# Patient Record
Sex: FEMALE | Race: WHITE | Employment: PART TIME | URBAN - METROPOLITAN AREA
[De-identification: names, ages, dates, MRNs, and addresses within clinical notes are randomized per-mention and may not be internally consistent; named-entity substitution may affect disease eponyms.]

---

## 2017-12-10 ENCOUNTER — APPOINTMENT (OUTPATIENT)
Dept: GENERAL RADIOLOGY | Age: 27
End: 2017-12-10
Payer: COMMERCIAL

## 2017-12-10 ENCOUNTER — HOSPITAL ENCOUNTER (EMERGENCY)
Age: 27
Discharge: HOME | End: 2017-12-10
Attending: EMERGENCY MEDICINE
Payer: COMMERCIAL

## 2017-12-10 VITALS
RESPIRATION RATE: 16 BRPM | DIASTOLIC BLOOD PRESSURE: 56 MMHG | HEIGHT: 62 IN | OXYGEN SATURATION: 97 % | WEIGHT: 180 LBS | BODY MASS INDEX: 33.13 KG/M2 | HEART RATE: 74 BPM | SYSTOLIC BLOOD PRESSURE: 127 MMHG | TEMPERATURE: 97.3 F

## 2017-12-10 DIAGNOSIS — M54.50 ACUTE BILATERAL LOW BACK PAIN WITHOUT SCIATICA: Primary | ICD-10-CM

## 2017-12-10 LAB
-: ABNORMAL
AMORPHOUS: ABNORMAL
BACTERIA: ABNORMAL
BILIRUBIN URINE: NEGATIVE
CASTS UA: ABNORMAL /LPF (ref 0–8)
COLOR: YELLOW
COMMENT UA: ABNORMAL
CRYSTALS, UA: ABNORMAL /HPF
EPITHELIAL CELLS UA: ABNORMAL /HPF (ref 0–5)
GLUCOSE URINE: NEGATIVE
HCG(URINE) PREGNANCY TEST: NEGATIVE
KETONES, URINE: NEGATIVE
LEUKOCYTE ESTERASE, URINE: ABNORMAL
MUCUS: ABNORMAL
NITRITE, URINE: NEGATIVE
OTHER OBSERVATIONS UA: ABNORMAL
PH UA: 7.5 (ref 5–8)
PROTEIN UA: NEGATIVE
RBC UA: ABNORMAL /HPF (ref 0–4)
RENAL EPITHELIAL, UA: ABNORMAL /HPF
SEDIMENTATION RATE, ERYTHROCYTE: 3 MM (ref 0–20)
SPECIFIC GRAVITY UA: 1.02 (ref 1–1.03)
TRICHOMONAS: ABNORMAL
TURBIDITY: ABNORMAL
URINE HGB: NEGATIVE
UROBILINOGEN, URINE: NORMAL
WBC UA: ABNORMAL /HPF (ref 0–5)
YEAST: ABNORMAL

## 2017-12-10 PROCEDURE — 81001 URINALYSIS AUTO W/SCOPE: CPT

## 2017-12-10 PROCEDURE — 96372 THER/PROPH/DIAG INJ SC/IM: CPT

## 2017-12-10 PROCEDURE — 72170 X-RAY EXAM OF PELVIS: CPT

## 2017-12-10 PROCEDURE — 84703 CHORIONIC GONADOTROPIN ASSAY: CPT

## 2017-12-10 PROCEDURE — 99283 EMERGENCY DEPT VISIT LOW MDM: CPT

## 2017-12-10 PROCEDURE — 85651 RBC SED RATE NONAUTOMATED: CPT

## 2017-12-10 PROCEDURE — 6370000000 HC RX 637 (ALT 250 FOR IP): Performed by: INTERNAL MEDICINE

## 2017-12-10 PROCEDURE — 6360000002 HC RX W HCPCS: Performed by: INTERNAL MEDICINE

## 2017-12-10 RX ORDER — IBUPROFEN 400 MG/1
400 TABLET ORAL
Status: DISCONTINUED | OUTPATIENT
Start: 2017-12-10 | End: 2017-12-10

## 2017-12-10 RX ORDER — FENTANYL CITRATE 50 UG/ML
50 INJECTION, SOLUTION INTRAMUSCULAR; INTRAVENOUS ONCE
Status: COMPLETED | OUTPATIENT
Start: 2017-12-10 | End: 2017-12-10

## 2017-12-10 RX ORDER — IBUPROFEN 400 MG/1
600 TABLET ORAL
Status: DISCONTINUED | OUTPATIENT
Start: 2017-12-10 | End: 2017-12-10 | Stop reason: HOSPADM

## 2017-12-10 RX ORDER — TIZANIDINE 4 MG/1
4 TABLET ORAL EVERY 6 HOURS PRN
Qty: 20 TABLET | Refills: 0 | Status: SHIPPED | OUTPATIENT
Start: 2017-12-10 | End: 2018-02-24

## 2017-12-10 RX ORDER — FENTANYL CITRATE 50 UG/ML
25 INJECTION, SOLUTION INTRAMUSCULAR; INTRAVENOUS
Status: DISCONTINUED | OUTPATIENT
Start: 2017-12-10 | End: 2017-12-10

## 2017-12-10 RX ORDER — TIZANIDINE 4 MG/1
4 TABLET ORAL EVERY 6 HOURS PRN
Status: DISCONTINUED | OUTPATIENT
Start: 2017-12-10 | End: 2017-12-10 | Stop reason: HOSPADM

## 2017-12-10 RX ORDER — DICLOFENAC SODIUM 25 MG/1
50 TABLET, DELAYED RELEASE ORAL 2 TIMES DAILY
Status: DISCONTINUED | OUTPATIENT
Start: 2017-12-10 | End: 2017-12-10 | Stop reason: CLARIF

## 2017-12-10 RX ADMIN — FENTANYL CITRATE 50 MCG: 50 INJECTION, SOLUTION INTRAMUSCULAR; INTRAVENOUS at 10:18

## 2017-12-10 RX ADMIN — IBUPROFEN 600 MG: 400 TABLET, FILM COATED ORAL at 09:24

## 2017-12-10 RX ADMIN — TIZANIDINE 4 MG: 4 TABLET ORAL at 09:25

## 2017-12-10 ASSESSMENT — ENCOUNTER SYMPTOMS
VOMITING: 0
DOUBLE VISION: 0
BACK PAIN: 1
NAUSEA: 0
HEARTBURN: 0
BLURRED VISION: 0
HEMOPTYSIS: 0
COUGH: 0

## 2017-12-10 ASSESSMENT — PAIN SCALES - GENERAL: PAINLEVEL_OUTOF10: 8

## 2017-12-10 ASSESSMENT — PAIN DESCRIPTION - ORIENTATION: ORIENTATION: RIGHT

## 2017-12-10 ASSESSMENT — PAIN DESCRIPTION - DESCRIPTORS: DESCRIPTORS: ACHING

## 2017-12-10 ASSESSMENT — PAIN DESCRIPTION - FREQUENCY: FREQUENCY: CONTINUOUS

## 2017-12-10 ASSESSMENT — PAIN DESCRIPTION - LOCATION: LOCATION: BUTTOCKS

## 2017-12-10 ASSESSMENT — PAIN DESCRIPTION - PAIN TYPE: TYPE: ACUTE PAIN

## 2017-12-10 NOTE — ED NOTES
Pt informed need for urine sample. Pt ambulated to restroom with steady gait.  Urine cup provided     Amanda Butler RN  12/10/17 0190

## 2017-12-10 NOTE — ED NOTES
Pt arrived to ER with reports of right buttock pain starting 2 days ago. Pt reports dull aching increasing in pain at times. Pt denies any other symptoms. Family at bedside.  Pt updated     Saul Montoya RN  12/10/17 [id number]

## 2017-12-10 NOTE — ED PROVIDER NOTES
[address]  ED  eMERGENCY dEPARTMENT eNCOUnter  Resident    Pt Name: Vivi Pickard  MRN: [id number]  [place] 1990  Date of evaluation: 12/10/2017  PCP:  No primary care provider on file. CHIEF COMPLAINT       Chief Complaint   Patient presents with    Buttocks Pain         HISTORY OF PRESENT ILLNESS    Vivi Pickard is a 32 y.o. female no significant past medical history presented with back pain. Patient states that the pain started yesterday when she was at work. Pain is at the lower back in a bandlike fashion extending from left to right. Pain is sharp and on 10 in severity, no radiating of pain , no aggravating factors or relieving factors. Denies any weakness, numbness or tingling in the legs ,  denies any bowel or urinary incontinence. Patient left 7-8 pounds of weight at work , denies any injury to back  Denies any fever chills nausea vomiting   X RAY PELVIS : WNL   UA \" + uti but pt is ASYMPTOMATIC   REVIEW OF SYSTEMS       Review of Systems   Constitutional: Negative for chills, fever and weight loss. HENT: Negative for ear pain, hearing loss and tinnitus. Eyes: Negative for blurred vision and double vision. Respiratory: Negative for cough and hemoptysis. Cardiovascular: Negative for chest pain and palpitations. Gastrointestinal: Negative for heartburn, nausea and vomiting. Genitourinary: Negative for dysuria and urgency. Musculoskeletal: Positive for back pain and myalgias. Negative for falls, joint pain and neck pain. Skin: Negative for itching and rash. Neurological: Negative for dizziness, tingling and headaches. PAST MEDICAL HISTORY    has no past medical history on file. SURGICAL HISTORY      has no past surgical history on file. CURRENT MEDICATIONS       Previous Medications    No medications on file       ALLERGIES     has No Known Allergies. FAMILY HISTORY     has no family status information on file.       family history is not DISPOSITION      HOME   MUSCLE RELAXANTS     PATIENT REFERRED TO:  No follow-up provider specified.     DISCHARGE MEDICATIONS:  New Prescriptions    No medications on file       (Please note that portions of this note were completed with a voice recognition program.  Efforts were made to edit the dictations but occasionally words are mis-transcribed.)    Cheli Turner MD  PGY-3 Internal Medicine Resident   [facility]            Yusef Maier MD  Resident  12/10/17 [id number]

## 2017-12-10 NOTE — ED PROVIDER NOTES
No saddle anesthesia. No fevers chills or sweats. No UTI symptoms. No pelvic pain. She is one week post her last menstrual period and no vaginal discharge or dyspareunia. On exam she is uncomfortable but nontoxic vital signs are normal.  There is no midline spine tenderness. There is bilateral SI joint tenderness. There is pain in the SI joints with hip movement. Motor strength is full. Normal sensation light touch. Negative straight leg raising. Impression is sacral pain likely from early sacroiliitis. Plan is imaging, analgesics, ice, rest, follow-up to PCP, note for work. Marcelino Farrell.  Kristan Montaño MD, [address] [facility]  Attending Emergency  Physician                Tamera Cowden, MD  12/10/17 1457

## 2018-02-24 ENCOUNTER — HOSPITAL ENCOUNTER (EMERGENCY)
Age: 28
Discharge: HOME | End: 2018-02-24
Attending: EMERGENCY MEDICINE
Payer: COMMERCIAL

## 2018-02-24 VITALS
RESPIRATION RATE: 20 BRPM | OXYGEN SATURATION: 98 % | BODY MASS INDEX: 34.96 KG/M2 | TEMPERATURE: 99.3 F | HEART RATE: 107 BPM | HEIGHT: 62 IN | WEIGHT: 190 LBS | SYSTOLIC BLOOD PRESSURE: 135 MMHG | DIASTOLIC BLOOD PRESSURE: 86 MMHG

## 2018-02-24 DIAGNOSIS — B34.9 ACUTE VIRAL SYNDROME: Primary | ICD-10-CM

## 2018-02-24 DIAGNOSIS — R74.8 ELEVATED LIPASE: ICD-10-CM

## 2018-02-24 LAB
-: ABNORMAL
ABSOLUTE EOS #: 0 K/UL (ref 0–0.4)
ABSOLUTE IMMATURE GRANULOCYTE: 0 K/UL (ref 0–0.3)
ABSOLUTE LYMPH #: 0.41 K/UL (ref 1–4.8)
ABSOLUTE MONO #: 0.12 K/UL (ref 0.1–0.8)
ALBUMIN SERPL-MCNC: 4.4 G/DL (ref 3.5–5.2)
ALBUMIN/GLOBULIN RATIO: 1.3 (ref 1–2.5)
ALP BLD-CCNC: 74 U/L (ref 35–104)
ALT SERPL-CCNC: 18 U/L (ref 5–33)
AMORPHOUS: ABNORMAL
ANION GAP SERPL CALCULATED.3IONS-SCNC: 12 MMOL/L (ref 9–17)
AST SERPL-CCNC: 30 U/L
BACTERIA: ABNORMAL
BASOPHILS # BLD: 0 % (ref 0–2)
BASOPHILS ABSOLUTE: 0 K/UL (ref 0–0.2)
BILIRUB SERPL-MCNC: 0.88 MG/DL (ref 0.3–1.2)
BILIRUBIN URINE: NEGATIVE
BUN BLDV-MCNC: 13 MG/DL (ref 6–20)
BUN/CREAT BLD: ABNORMAL (ref 9–20)
CALCIUM SERPL-MCNC: 8.6 MG/DL (ref 8.6–10.4)
CASTS UA: ABNORMAL /LPF (ref 0–8)
CHLORIDE BLD-SCNC: 96 MMOL/L (ref 98–107)
CO2: 26 MMOL/L (ref 20–31)
COLOR: YELLOW
CREAT SERPL-MCNC: 0.4 MG/DL (ref 0.5–0.9)
CRYSTALS, UA: ABNORMAL /HPF
DIFFERENTIAL TYPE: ABNORMAL
DIRECT EXAM: NORMAL
EOSINOPHILS RELATIVE PERCENT: 0 % (ref 1–4)
EPITHELIAL CELLS UA: ABNORMAL /HPF (ref 0–5)
GFR AFRICAN AMERICAN: >60 ML/MIN
GFR NON-AFRICAN AMERICAN: >60 ML/MIN
GFR SERPL CREATININE-BSD FRML MDRD: ABNORMAL ML/MIN/{1.73_M2}
GFR SERPL CREATININE-BSD FRML MDRD: ABNORMAL ML/MIN/{1.73_M2}
GLUCOSE BLD-MCNC: 96 MG/DL (ref 70–99)
GLUCOSE URINE: NEGATIVE
HCG(URINE) PREGNANCY TEST: NEGATIVE
HCT VFR BLD CALC: 43.2 % (ref 36.3–47.1)
HEMOGLOBIN: 14.8 G/DL (ref 11.9–15.1)
IMMATURE GRANULOCYTES: 0 %
KETONES, URINE: NEGATIVE
LEUKOCYTE ESTERASE, URINE: NEGATIVE
LIPASE: 117 U/L (ref 13–60)
LYMPHOCYTES # BLD: 7 % (ref 24–44)
Lab: NORMAL
MCH RBC QN AUTO: 31.8 PG (ref 25.2–33.5)
MCHC RBC AUTO-ENTMCNC: 34.3 G/DL (ref 28.4–34.8)
MCV RBC AUTO: 92.7 FL (ref 82.6–102.9)
MONOCYTES # BLD: 2 % (ref 1–7)
MORPHOLOGY: NORMAL
MUCUS: ABNORMAL
NITRITE, URINE: NEGATIVE
NRBC AUTOMATED: 0 PER 100 WBC
OTHER OBSERVATIONS UA: ABNORMAL
PDW BLD-RTO: 12.2 % (ref 11.8–14.4)
PH UA: 5.5 (ref 5–8)
PLATELET # BLD: 196 K/UL (ref 138–453)
PLATELET ESTIMATE: ABNORMAL
PMV BLD AUTO: 10.2 FL (ref 8.1–13.5)
POTASSIUM SERPL-SCNC: 3.9 MMOL/L (ref 3.7–5.3)
PROTEIN UA: NEGATIVE
RBC # BLD: 4.66 M/UL (ref 3.95–5.11)
RBC # BLD: ABNORMAL 10*6/UL
RBC UA: ABNORMAL /HPF (ref 0–4)
RENAL EPITHELIAL, UA: ABNORMAL /HPF
SEG NEUTROPHILS: 91 % (ref 36–66)
SEGMENTED NEUTROPHILS ABSOLUTE COUNT: 5.37 K/UL (ref 1.8–7.7)
SODIUM BLD-SCNC: 134 MMOL/L (ref 135–144)
SPECIFIC GRAVITY UA: 1.03 (ref 1–1.03)
SPECIMEN DESCRIPTION: NORMAL
STATUS: NORMAL
TOTAL PROTEIN: 7.7 G/DL (ref 6.4–8.3)
TRICHOMONAS: ABNORMAL
TURBIDITY: CLEAR
URINE HGB: NEGATIVE
UROBILINOGEN, URINE: NORMAL
WBC # BLD: 5.9 K/UL (ref 3.5–11.3)
WBC # BLD: ABNORMAL 10*3/UL
WBC UA: ABNORMAL /HPF (ref 0–5)
YEAST: ABNORMAL

## 2018-02-24 PROCEDURE — 84703 CHORIONIC GONADOTROPIN ASSAY: CPT

## 2018-02-24 PROCEDURE — 81001 URINALYSIS AUTO W/SCOPE: CPT

## 2018-02-24 PROCEDURE — 96375 TX/PRO/DX INJ NEW DRUG ADDON: CPT

## 2018-02-24 PROCEDURE — 96374 THER/PROPH/DIAG INJ IV PUSH: CPT

## 2018-02-24 PROCEDURE — 87804 INFLUENZA ASSAY W/OPTIC: CPT

## 2018-02-24 PROCEDURE — 99284 EMERGENCY DEPT VISIT MOD MDM: CPT

## 2018-02-24 PROCEDURE — 85025 COMPLETE CBC W/AUTO DIFF WBC: CPT

## 2018-02-24 PROCEDURE — 83690 ASSAY OF LIPASE: CPT

## 2018-02-24 PROCEDURE — 6360000002 HC RX W HCPCS: Performed by: PHYSICIAN ASSISTANT

## 2018-02-24 PROCEDURE — 2580000003 HC RX 258: Performed by: PHYSICIAN ASSISTANT

## 2018-02-24 PROCEDURE — 80053 COMPREHEN METABOLIC PANEL: CPT

## 2018-02-24 RX ORDER — DIPHENHYDRAMINE HYDROCHLORIDE 50 MG/ML
25 INJECTION INTRAMUSCULAR; INTRAVENOUS ONCE
Status: COMPLETED | OUTPATIENT
Start: 2018-02-24 | End: 2018-02-24

## 2018-02-24 RX ORDER — IBUPROFEN 800 MG/1
800 TABLET ORAL EVERY 8 HOURS PRN
Qty: 30 TABLET | Refills: 0 | Status: SHIPPED | OUTPATIENT
Start: 2018-02-24 | End: 2018-06-26

## 2018-02-24 RX ORDER — ACETAMINOPHEN 325 MG/1
650 TABLET ORAL EVERY 6 HOURS PRN
Qty: 40 TABLET | Refills: 0 | Status: SHIPPED | OUTPATIENT
Start: 2018-02-24 | End: 2018-06-26

## 2018-02-24 RX ORDER — 0.9 % SODIUM CHLORIDE 0.9 %
1000 INTRAVENOUS SOLUTION INTRAVENOUS ONCE
Status: COMPLETED | OUTPATIENT
Start: 2018-02-24 | End: 2018-02-24

## 2018-02-24 RX ORDER — ONDANSETRON 4 MG/1
4 TABLET, ORALLY DISINTEGRATING ORAL EVERY 8 HOURS PRN
Qty: 15 TABLET | Refills: 0 | Status: SHIPPED | OUTPATIENT
Start: 2018-02-24 | End: 2018-06-26

## 2018-02-24 RX ADMIN — PROCHLORPERAZINE EDISYLATE 10 MG: 5 INJECTION INTRAMUSCULAR; INTRAVENOUS at 17:31

## 2018-02-24 RX ADMIN — DIPHENHYDRAMINE HYDROCHLORIDE 25 MG: 50 INJECTION, SOLUTION INTRAMUSCULAR; INTRAVENOUS at 17:33

## 2018-02-24 RX ADMIN — SODIUM CHLORIDE 1000 ML: 9 INJECTION, SOLUTION INTRAVENOUS at 17:30

## 2018-02-24 ASSESSMENT — PAIN DESCRIPTION - DESCRIPTORS: DESCRIPTORS: HEADACHE

## 2018-02-24 ASSESSMENT — ENCOUNTER SYMPTOMS
DIARRHEA: 0
VOMITING: 0
ALLERGIC/IMMUNOLOGIC NEGATIVE: 1
CONSTIPATION: 0
ABDOMINAL PAIN: 1
EYES NEGATIVE: 1
RESPIRATORY NEGATIVE: 1
NAUSEA: 1

## 2018-02-24 ASSESSMENT — PAIN DESCRIPTION - LOCATION: LOCATION: HEAD

## 2018-02-24 ASSESSMENT — PAIN DESCRIPTION - FREQUENCY: FREQUENCY: CONTINUOUS

## 2018-02-24 ASSESSMENT — PAIN SCALES - GENERAL: PAINLEVEL_OUTOF10: 8

## 2018-02-24 NOTE — ED PROVIDER NOTES
pains bilaterally   Allergic/Immunologic: Negative. Neurological: Positive for headaches. Hematological: Negative. Psychiatric/Behavioral: Negative. PHYSICAL EXAM   (up to 7 for level 4, 8 or more for level 5)      INITIAL VITALS:  height is 5' 2\" (1.575 m) and weight is 190 lb (86.2 kg). Her oral temperature is 99.3 °F (37.4 °C). Her blood pressure is 135/86 and her pulse is 107. Her respiration is 20 and oxygen saturation is 98%. Physical Exam   Constitutional: She is oriented to person, place, and time. Vital signs are normal. She appears well-developed and well-nourished. No distress. Patient appears very uncomfortable. Her skin is very hot to the touch. She is not actively vomiting. She is in no acute distress   HENT:   Head: Normocephalic and atraumatic. Right Ear: Tympanic membrane, external ear and ear canal normal.   Left Ear: Tympanic membrane, external ear and ear canal normal.   Nose: Nose normal.   Mouth/Throat: Uvula is midline, oropharynx is clear and moist and mucous membranes are normal. No oropharyngeal exudate. Tolerating secretions well. No trismus. Posterior oropharynx is nonerythematous and nonedematous. Tonsils are nonenlarged and symmetric. Uvula is midline, nondeviated, nonedematous. Submandibular space non-edematous. Eyes: Conjunctivae are normal. Pupils are equal, round, and reactive to light. Pupil equal, round, reactive to light bilaterally at approximately 4 mm. There is no photophobia with this exam.  Extraocular eye movements are grossly intact bilaterally with no nystagmus. Conjunctiva noninjected bilaterally   Neck: Trachea normal, normal range of motion and full passive range of motion without pain. No tracheal tenderness present. No tracheal deviation present. No Brudzinski's sign and no Kernig's sign noted. Anterior neck is soft, supple and symmetric.     There is no lymphadenopathy appreciated preauricular, postauricular, submandibular, rechecked by her primary care doctor in approximately 1-2 weeks The discharge instructions and plan was discussed with the patient. All d/c medications were reviewed with patient and patient understands the risk/benefit of using the medications. All questions were asked and answered per pt report. Patient is in agreement with discharge plan. Maddy [place] PROCEDURES:  None    FINAL IMPRESSION      1. Acute viral syndrome    2. Elevated lipase          DISPOSITION / PLAN     DISPOSITION   Tylenol. Motrin. Zofran. Follow-up with PCP. Return to ER. Work note  PATIENT REFERRED TO:  Miri Mondragon  [facility]  [address] [place] [address]  [phone]  Call in 2 days  to establish pcp if you currently do not have one.     [facility]  [address] [place] [address]  [phone]  Go to   As needed, If symptoms worsen      DISCHARGE MEDICATIONS:  New Prescriptions    ACETAMINOPHEN (TYLENOL) 325 MG TABLET    Take 2 tablets by mouth every 6 hours as needed for Pain or Fever    IBUPROFEN (ADVIL;MOTRIN) 800 MG TABLET    Take 1 tablet by mouth every 8 hours as needed for Pain or Fever    ONDANSETRON (ZOFRAN ODT) 4 MG DISINTEGRATING TABLET    Take 1 tablet by mouth every 8 hours as needed for Nausea or Vomiting       Aruna Coe PA-C PA-C  Emergency Medicine Physician Assistant    (Please note that portions of this note were completed with a voice recognition program.  Efforts were made to edit the dictations but occasionally words are mis-transcribed.)     Aruna Coe PA-C  02/24/18 [id number]

## 2018-06-06 LAB — PAP SMEAR: NORMAL

## 2018-06-26 ENCOUNTER — OFFICE VISIT (OUTPATIENT)
Dept: FAMILY MEDICINE CLINIC | Age: 28
End: 2018-06-26
Payer: COMMERCIAL

## 2018-06-26 ENCOUNTER — HOSPITAL ENCOUNTER (OUTPATIENT)
Age: 28
Setting detail: SPECIMEN
Discharge: HOME | End: 2018-06-26
Payer: COMMERCIAL

## 2018-06-26 VITALS
WEIGHT: 196.4 LBS | SYSTOLIC BLOOD PRESSURE: 110 MMHG | DIASTOLIC BLOOD PRESSURE: 78 MMHG | BODY MASS INDEX: 36.14 KG/M2 | HEIGHT: 62 IN | OXYGEN SATURATION: 98 % | TEMPERATURE: 98.2 F | RESPIRATION RATE: 16 BRPM | HEART RATE: 77 BPM

## 2018-06-26 DIAGNOSIS — Z13.220 SCREENING FOR HYPERLIPIDEMIA: ICD-10-CM

## 2018-06-26 DIAGNOSIS — G89.29 CHRONIC RIGHT HIP PAIN: ICD-10-CM

## 2018-06-26 DIAGNOSIS — E66.09 CLASS 2 OBESITY DUE TO EXCESS CALORIES WITHOUT SERIOUS COMORBIDITY WITH BODY MASS INDEX (BMI) OF 35.0 TO 35.9 IN ADULT: ICD-10-CM

## 2018-06-26 DIAGNOSIS — Z13.0 SCREENING FOR DEFICIENCY ANEMIA: ICD-10-CM

## 2018-06-26 DIAGNOSIS — Z13.29 SCREENING FOR THYROID DISORDER: Primary | ICD-10-CM

## 2018-06-26 DIAGNOSIS — M25.551 CHRONIC RIGHT HIP PAIN: ICD-10-CM

## 2018-06-26 DIAGNOSIS — G89.29 CHRONIC PAIN OF RIGHT KNEE: ICD-10-CM

## 2018-06-26 DIAGNOSIS — K59.04 CHRONIC IDIOPATHIC CONSTIPATION: ICD-10-CM

## 2018-06-26 DIAGNOSIS — Z13.29 SCREENING FOR THYROID DISORDER: ICD-10-CM

## 2018-06-26 DIAGNOSIS — M25.561 CHRONIC PAIN OF RIGHT KNEE: ICD-10-CM

## 2018-06-26 LAB
ABSOLUTE EOS #: 0.13 K/UL (ref 0–0.44)
ABSOLUTE IMMATURE GRANULOCYTE: <0.03 K/UL (ref 0–0.3)
ABSOLUTE LYMPH #: 1.3 K/UL (ref 1.1–3.7)
ABSOLUTE MONO #: 0.44 K/UL (ref 0.1–1.2)
ALBUMIN SERPL-MCNC: 4.4 G/DL (ref 3.5–5.2)
ALBUMIN/GLOBULIN RATIO: 1.6 (ref 1–2.5)
ALP BLD-CCNC: 66 U/L (ref 35–104)
ALT SERPL-CCNC: 21 U/L (ref 5–33)
ANION GAP SERPL CALCULATED.3IONS-SCNC: 12 MMOL/L (ref 9–17)
AST SERPL-CCNC: 19 U/L
BASOPHILS # BLD: 1 % (ref 0–2)
BASOPHILS ABSOLUTE: 0.03 K/UL (ref 0–0.2)
BILIRUB SERPL-MCNC: 0.46 MG/DL (ref 0.3–1.2)
BUN BLDV-MCNC: 7 MG/DL (ref 6–20)
BUN/CREAT BLD: ABNORMAL (ref 9–20)
CALCIUM SERPL-MCNC: 9 MG/DL (ref 8.6–10.4)
CHLORIDE BLD-SCNC: 105 MMOL/L (ref 98–107)
CHOLESTEROL, FASTING: 163 MG/DL
CHOLESTEROL/HDL RATIO: 1.9
CO2: 24 MMOL/L (ref 20–31)
CREAT SERPL-MCNC: 0.44 MG/DL (ref 0.5–0.9)
DIFFERENTIAL TYPE: NORMAL
EOSINOPHILS RELATIVE PERCENT: 3 % (ref 1–4)
GFR AFRICAN AMERICAN: >60 ML/MIN
GFR NON-AFRICAN AMERICAN: >60 ML/MIN
GFR SERPL CREATININE-BSD FRML MDRD: ABNORMAL ML/MIN/{1.73_M2}
GFR SERPL CREATININE-BSD FRML MDRD: ABNORMAL ML/MIN/{1.73_M2}
GLUCOSE BLD-MCNC: 84 MG/DL (ref 70–99)
HCT VFR BLD CALC: 42.1 % (ref 36.3–47.1)
HDLC SERPL-MCNC: 85 MG/DL
HEMOGLOBIN: 14.1 G/DL (ref 11.9–15.1)
IMMATURE GRANULOCYTES: 0 %
LDL CHOLESTEROL: 70 MG/DL (ref 0–130)
LYMPHOCYTES # BLD: 27 % (ref 24–43)
MCH RBC QN AUTO: 31.5 PG (ref 25.2–33.5)
MCHC RBC AUTO-ENTMCNC: 33.5 G/DL (ref 28.4–34.8)
MCV RBC AUTO: 94.2 FL (ref 82.6–102.9)
MONOCYTES # BLD: 9 % (ref 3–12)
NRBC AUTOMATED: 0 PER 100 WBC
PDW BLD-RTO: 12.7 % (ref 11.8–14.4)
PLATELET # BLD: NORMAL K/UL (ref 138–453)
PLATELET ESTIMATE: NORMAL
PLATELET, FLUORESCENCE: 292 K/UL (ref 138–453)
PLATELET, IMMATURE FRACTION: 1.3 % (ref 1.1–10.3)
PMV BLD AUTO: NORMAL FL (ref 8.1–13.5)
POTASSIUM SERPL-SCNC: 4.4 MMOL/L (ref 3.7–5.3)
RBC # BLD: 4.47 M/UL (ref 3.95–5.11)
RBC # BLD: NORMAL 10*6/UL
SEG NEUTROPHILS: 61 % (ref 36–65)
SEGMENTED NEUTROPHILS ABSOLUTE COUNT: 3.01 K/UL (ref 1.5–8.1)
SODIUM BLD-SCNC: 141 MMOL/L (ref 135–144)
TOTAL PROTEIN: 7.2 G/DL (ref 6.4–8.3)
TRIGLYCERIDE, FASTING: 38 MG/DL
TSH SERPL DL<=0.05 MIU/L-ACNC: 2.67 MIU/L (ref 0.3–5)
VLDLC SERPL CALC-MCNC: NORMAL MG/DL (ref 1–30)
WBC # BLD: 4.9 K/UL (ref 3.5–11.3)
WBC # BLD: NORMAL 10*3/UL

## 2018-06-26 PROCEDURE — 1036F TOBACCO NON-USER: CPT | Performed by: INTERNAL MEDICINE

## 2018-06-26 PROCEDURE — G8427 DOCREV CUR MEDS BY ELIG CLIN: HCPCS | Performed by: INTERNAL MEDICINE

## 2018-06-26 PROCEDURE — 99214 OFFICE O/P EST MOD 30 MIN: CPT | Performed by: INTERNAL MEDICINE

## 2018-06-26 PROCEDURE — G8417 CALC BMI ABV UP PARAM F/U: HCPCS | Performed by: INTERNAL MEDICINE

## 2018-06-26 RX ORDER — NAPROXEN 500 MG/1
500 TABLET ORAL 2 TIMES DAILY WITH MEALS
Qty: 60 TABLET | Refills: 3 | Status: SHIPPED | OUTPATIENT
Start: 2018-06-26 | End: 2019-01-07 | Stop reason: SDUPTHER

## 2018-06-26 ASSESSMENT — PATIENT HEALTH QUESTIONNAIRE - PHQ9
2. FEELING DOWN, DEPRESSED OR HOPELESS: 0
1. LITTLE INTEREST OR PLEASURE IN DOING THINGS: 0
SUM OF ALL RESPONSES TO PHQ QUESTIONS 1-9: 0
SUM OF ALL RESPONSES TO PHQ9 QUESTIONS 1 & 2: 0

## 2018-07-30 ENCOUNTER — OFFICE VISIT (OUTPATIENT)
Dept: FAMILY MEDICINE CLINIC | Age: 28
End: 2018-07-30
Payer: COMMERCIAL

## 2018-07-30 VITALS
TEMPERATURE: 98.4 F | BODY MASS INDEX: 36.25 KG/M2 | SYSTOLIC BLOOD PRESSURE: 122 MMHG | RESPIRATION RATE: 16 BRPM | WEIGHT: 197 LBS | DIASTOLIC BLOOD PRESSURE: 82 MMHG | OXYGEN SATURATION: 97 % | HEART RATE: 82 BPM | HEIGHT: 62 IN

## 2018-07-30 DIAGNOSIS — M62.81 MUSCLE WEAKNESS: ICD-10-CM

## 2018-07-30 DIAGNOSIS — M54.50 CHRONIC RIGHT-SIDED LOW BACK PAIN WITHOUT SCIATICA: Primary | ICD-10-CM

## 2018-07-30 DIAGNOSIS — G89.29 CHRONIC RIGHT-SIDED LOW BACK PAIN WITHOUT SCIATICA: Primary | ICD-10-CM

## 2018-07-30 PROCEDURE — 1036F TOBACCO NON-USER: CPT | Performed by: INTERNAL MEDICINE

## 2018-07-30 PROCEDURE — G8417 CALC BMI ABV UP PARAM F/U: HCPCS | Performed by: INTERNAL MEDICINE

## 2018-07-30 PROCEDURE — G8427 DOCREV CUR MEDS BY ELIG CLIN: HCPCS | Performed by: INTERNAL MEDICINE

## 2018-07-30 PROCEDURE — 99214 OFFICE O/P EST MOD 30 MIN: CPT | Performed by: INTERNAL MEDICINE

## 2018-07-30 ASSESSMENT — ENCOUNTER SYMPTOMS
BACK PAIN: 1
STRIDOR: 0
CONSTIPATION: 0
BLOOD IN STOOL: 0
COLOR CHANGE: 0
BOWEL INCONTINENCE: 0
CHEST TIGHTNESS: 0
WHEEZING: 0
SHORTNESS OF BREATH: 0
ABDOMINAL PAIN: 0
DIARRHEA: 0

## 2018-07-30 NOTE — PROGRESS NOTES
Visit Information    Have you changed or started any medications since your last visit including any over-the-counter medicines, vitamins, or herbal medicines? no   Are you having any side effects from any of your medications? -  no  Have you stopped taking any of your medications? Is so, why? -  no    Have you seen any other physician or provider since your last visit? No  Have you had any other diagnostic tests since your last visit? No  Have you been seen in the emergency room and/or had an admission to a hospital since we last saw you? No  Have you had your routine dental cleaning in the past 6 months? no    Have you activated your [email or web address] account? If not, what are your barriers?  Yes     Patient Care Team:  Raul Sweet DO as PCP - General (Family Medicine)  Alvin Davila MD as PCP - [facility] Attributed Provider    Medical History Review  Past Medical, Family, and Social History reviewed and does contribute to the patient presenting condition    Health Maintenance   Topic Date Due    Cervical cancer screen  12/15/2011    HIV screen  12/26/2018 (Originally 12/15/2005)    DTaP/Tdap/Td vaccine (1 - Tdap) 06/26/2019 (Originally 12/15/2009)    Flu vaccine (1) 09/01/2018

## 2018-07-30 NOTE — PATIENT INSTRUCTIONS
[email or web address] and sign in to your [employer] account. Enter B046 in the [place] box to learn more about \"Back Stretches: Exercises. \"     If you do not have an account, please click on the \"Sign Up Now\" link. Current as of: November 29, 2017  Content Version: 11.6  © 3720-7026 [employer]. Care instructions adapted under license by [facility] ([facility]). If you have questions about a medical condition or this instruction, always ask your healthcare professional. [address] any warranty or liability for your use of this information. Patient Education        Learning About Relief for Back Pain  What is back tension and strain? Back strain happens when you overstretch, or pull, a muscle in your back. You may hurt your back in an accident or when you exercise or lift something. Most back pain will get better with rest and time. You can take care of yourself at home to help your back heal.  What can you do first to relieve back pain? When you first feel back pain, try these steps:  · Walk. Take a short walk (10 to 20 minutes) on a level surface (no slopes, hills, or stairs) every 2 to 3 hours. Walk only distances you can manage without pain, especially leg pain. · Relax. Find a comfortable position for rest. Some people are comfortable on the floor or a medium-firm bed with a small pillow under their head and another under their knees. Some people prefer to lie on their side with a pillow between their knees. Don't stay in one position for too long. · Try heat or ice. Try using a heating pad on a low or medium setting, or take a warm shower, for 15 to 20 minutes every 2 to 3 hours. Or you can buy single-use heat wraps that last up to 8 hours. You can also try an ice pack for 10 to 15 minutes every 2 to 3 hours. You can use an ice pack or a bag of frozen vegetables wrapped in a thin towel.  There is not strong evidence that either heat or ice will help, but you can try them to see if they help. You may also want to try switching between heat and cold. · Take pain medicine exactly as directed. ¨ If the doctor gave you a prescription medicine for pain, take it as prescribed. ¨ If you are not taking a prescription pain medicine, ask your doctor if you can take an over-the-counter medicine. What else can you do? · Stretch and exercise. Exercises that increase flexibility may relieve your pain and make it easier for your muscles to keep your spine in a good, neutral position. And don't forget to keep walking. · Do self-massage. You can use self-massage to unwind after work or school or to energize yourself in the morning. You can easily massage your feet, hands, or neck. Self-massage works best if you are in comfortable clothes and are sitting or lying in a comfortable position. Use oil or lotion to massage bare skin. · Reduce stress. Back pain can lead to a vicious [place]: Distress about the pain tenses the muscles in your back, which in turn causes more pain. Learn how to relax your mind and your muscles to lower your stress. Where can you learn more? Go to https://[email or web address] Bioscience. org and sign in to your [email or web address] account. Enter N965 in the [employer] box to learn more about \"Learning About Relief for Back Pain. \"     If you do not have an account, please click on the \"Sign Up Now\" link. Current as of: November 29, 2017  Content Version: 11.6  © 2612-6181 [employer]. Care instructions adapted under license by [facility] ([facility]). If you have questions about a medical condition or this instruction, always ask your healthcare professional. [place] [address] any warranty or liability for your use of this information. Patient Education        Low Back Pain: Exercises  Your Care Instructions  Here are some examples of typical rehabilitation exercises for your condition. Start each exercise slowly. Ease off the exercise if you start to have pain. Your doctor or physical therapist will tell you when you can start these exercises and which ones will work best for you. How to do the exercises  Press-up    5. Lie on your stomach, supporting your body with your forearms. 6. Press your elbows down into the floor to raise your upper back. As you do this, relax your stomach muscles and allow your back to arch without using your back muscles. As your press up, do not let your hips or pelvis come off the floor. 7. Hold for 15 to 30 seconds, then relax. 8. Repeat 2 to 4 times. Alternate arm and leg (bird dog) exercise    Do this exercise slowly. Try to keep your body straight at all times, and do not let one hip drop lower than the other. 5. Start on the floor, on your hands and knees. 6. Tighten your belly muscles. 7. Raise one leg off the floor, and hold it straight out behind you. Be careful not to let your hip drop down, because that will twist your trunk. 8. Hold for about 6 seconds, then lower your leg and switch to the other leg. 9. Repeat 8 to 12 times on each leg. 10. Over time, work up to holding for 10 to 30 seconds each time. 11. If you feel stable and secure with your leg raised, try raising the opposite arm straight out in front of you at the same time. Knee-to-chest exercise    5. Lie on your back with your knees bent and your feet flat on the floor. 6. Bring one knee to your chest, keeping the other foot flat on the floor (or keeping the other leg straight, whichever feels better on your lower back). 7. Keep your lower back pressed to the floor. Hold for at least 15 to 30 seconds. 8. Relax, and lower the knee to the starting position. 9. Repeat with the other leg. Repeat 2 to 4 times with each leg. 10. To get more stretch, put your other leg flat on the floor while pulling your knee to your chest.  Curl-ups    5. Lie on the floor on your back with your knees bent at a 90-degree angle. Your feet should be flat on the floor, about 12 inches from your buttocks. 6. Cross your arms over your chest. If this bothers your neck, try putting your hands behind your neck (not your head), with your elbows spread apart. 7. Slowly tighten your belly muscles and raise your shoulder blades off the floor. 8. Keep your head in line with your body, and do not press your chin to your chest.  9. Hold this position for 1 or 2 seconds, then slowly lower yourself back down to the floor. 10. Repeat 8 to 12 times. Pelvic tilt exercise    1. Lie on your back with your knees bent. 2. \"Brace\" your stomach. This means to tighten your muscles by pulling in and imagining your belly button moving toward your spine. You should feel like your back is pressing to the floor and your hips and pelvis are rocking back. 3. Hold for about 6 seconds while you breathe smoothly. 4. Repeat 8 to 12 times. Heel dig bridging    1. Lie on your back with both knees bent and your ankles bent so that only your heels are digging into the floor. Your knees should be bent about 90 degrees. 2. Then push your heels into the floor, squeeze your buttocks, and lift your hips off the floor until your shoulders, hips, and knees are all in a straight line. 3. Hold for about 6 seconds as you continue to breathe normally, and then slowly lower your hips back down to the floor and rest for up to 10 seconds. 4. Do 8 to 12 repetitions. Hamstring stretch in doorway    1. Lie on your back in a doorway, with one leg through the open door. 2. Slide your leg up the wall to straighten your knee. You should feel a gentle stretch down the back of your leg. 3. Hold the stretch for at least 15 to 30 seconds. Do not arch your back, point your toes, or bend either knee. Keep one heel touching the floor and the other heel touching the wall. 4. Repeat with your other leg. 5. Do 2 to 4 times for each leg. Hip flexor stretch    1.  Kneel on the floor with one knee bent and one leg behind you. Place your forward knee over your foot. Keep your other knee touching the floor. 2. Slowly push your hips forward until you feel a stretch in the upper thigh of your rear leg. 3. Hold the stretch for at least 15 to 30 seconds. Repeat with your other leg. 4. Do 2 to 4 times on each side. Wall sit    1. Stand with your back 10 to 12 inches away from a wall. 2. Lean into the wall until your back is flat against it. 3. Slowly slide down until your knees are slightly bent, pressing your lower back into the wall. 4. Hold for about 6 seconds, then slide back up the wall. 5. Repeat 8 to 12 times. Follow-up care is a key part of your treatment and safety. Be sure to make and go to all appointments, and call your doctor if you are having problems. It's also a good idea to know your test results and keep a list of the medicines you take. Where can you learn more? Go to https://Tripshare.MaxTradeIn.com. org and sign in to your Zimplistic account. Enter W920 in the AddShoppers box to learn more about \"Low Back Pain: Exercises. \"     If you do not have an account, please click on the \"Sign Up Now\" link. Current as of: November 29, 2017  Content Version: 11.6  © 0746-6621 [employer]. Care instructions adapted under license by [facility] ([facility]). If you have questions about a medical condition or this instruction, always ask your healthcare professional. [place] [address] any warranty or liability for your use of this information.

## 2018-07-30 NOTE — PROGRESS NOTES
[address] [facility] [address]  Dept: [phone]  Dept Fax: [phone]    Yasemin Randolph is a 32 y.o. female who presents today for her medical conditions/complaints as noted below. Yasemin Randolph is c/o of   Chief Complaint   Patient presents with   [facility] Established New Doctor     insurance changed    Back Pain     mostly right side pain    Other     not due for pap she does to Елена sanders. had it done 6/2018       HPI:     Patient here to establish care after insurance changes. Is up to date on pap   Has low back pain and it shoots down her legs   Is so bad at times she cannot walk or stand and legs give out /weak   No numbness or tingling   Has not had it evaluated ie MRI or xray . No physical therapy   Has done other conservative tx ie rest, heat , ice and naproxen by previous docot which she states helps a little with both back and right knee. No other real sxs or concerns  Just had lab work a few motnhs ago that was all normal   noting like AS or lupus in family. Back Pain   This is a chronic problem. The current episode started more than 1 month ago. The problem occurs daily. The problem has been waxing and waning since onset. The pain is present in the gluteal and lumbar spine. The quality of the pain is described as aching, cramping and shooting. The pain radiates to the right foot and right thigh. The pain is at a severity of 7/10. The pain is moderate. The pain is the same all the time. The symptoms are aggravated by position, standing and sitting. Stiffness is present in the morning. Associated symptoms include leg pain, pelvic pain and weakness. Pertinent negatives include no abdominal pain, bladder incontinence, bowel incontinence, chest pain, dysuria, fever, headaches, numbness, paresis, paresthesias, perianal numbness, tingling or weight loss. Risk factors include obesity and lack of exercise. chills, diaphoresis, fatigue, fever, unexpected weight change and weight loss. Eyes: Negative for visual disturbance. Respiratory: Negative for chest tightness, shortness of breath, wheezing and stridor. Cardiovascular: Negative for chest pain, palpitations and leg swelling. Gastrointestinal: Negative for abdominal pain, blood in stool, bowel incontinence, constipation and diarrhea. Genitourinary: Positive for pelvic pain. Negative for bladder incontinence, dysuria, hematuria and urgency. Musculoskeletal: Positive for arthralgias, back pain and gait problem. Negative for joint swelling, myalgias, neck pain and neck stiffness. Skin: Negative for color change and rash. Neurological: Positive for weakness. Negative for tingling, numbness, headaches and paresthesias. Psychiatric/Behavioral: Negative for sleep disturbance. Objective:     Physical Exam   Constitutional: She is oriented to person, place, and time. She appears well-developed and well-nourished. Obese   HENT:   Right Ear: External ear normal.   Left Ear: External ear normal.   Nose: Nose normal.   Eyes: EOM are normal. Pupils are equal, round, and reactive to light. Cardiovascular: Normal rate, regular rhythm and normal heart sounds. Pulmonary/Chest: Effort normal and breath sounds normal.   Abdominal: Soft. Bowel sounds are normal. There is no splenomegaly or hepatomegaly. There is no tenderness. Musculoskeletal:        Lumbar back: She exhibits decreased range of motion, tenderness, pain and spasm. She exhibits no bony tenderness, no swelling and no edema. Neurological: She is alert and oriented to person, place, and time. She has normal strength. No cranial nerve deficit or sensory deficit. Gait abnormal.   Skin: Skin is warm and dry. No rash noted. Psychiatric: She has a normal mood and affect. Nursing note and vitals reviewed.     /82 (Site: Left Arm, Position: Sitting, Cuff Size: Medium Adult)   Pulse 82 Temp 98.4 °F (36.9 °C) (Tympanic)   Resp 16   Ht 5' 2\" (1.575 m)   Wt 197 lb (89.4 kg)   LMP 06/26/2018 (Exact Date)   SpO2 97%   Breastfeeding? No   BMI 36.03 kg/m²     Assessment:       Diagnosis Orders   1. Chronic right-sided low back pain without sciatica  MRI Lumbar Spine WO Contrast   2. Muscle weakness  MRI Lumbar Spine WO Contrast             Plan:      Return if symptoms worsen or fail to improve. Orders Placed This Encounter   Procedures    MRI Lumbar Spine WO Contrast     Standing Status:   Future     Standing Expiration Date:   7/30/2019     Order Specific Question:   Reason for exam:     Answer:   chronic  low back pain , muscle weakness    PAP SMEAR     This order was created through External Result Entry     Order Specific Question:   Prior Abnormal Pap Test     Answer:   No     Order Specific Question:   Screening or Diagnostic     Answer:   Screening     Order Specific Question:   High Risk Patient     Answer:   N/A     No orders of the defined types were placed in this encounter. Will obtain MRI given length of sxs more than 6 weeks and overall worsening. Continue naproxen   Consider chiropractors or massage therapy. 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 Follow up after testing   Consider inflammatory markers and HLA in future if MRI normal .   Call with questions or concerns. Patient given educational materials - see patient instructions. Discussed use, benefit, and side effects of prescribed medications. All patient questions answered. Pt voiced understanding. Reviewed health maintenance. Instructed to continue current medications, diet and exercise. Patient agreed with treatment plan. Follow up as directed.      Electronically signed by Bello Ayala DO on 7/30/2018 at 10:21 AM

## 2018-08-07 ENCOUNTER — HOSPITAL ENCOUNTER (OUTPATIENT)
Dept: MRI IMAGING | Age: 28
Discharge: HOME | End: 2018-08-09
Payer: COMMERCIAL

## 2018-08-07 DIAGNOSIS — M54.50 CHRONIC RIGHT-SIDED LOW BACK PAIN WITHOUT SCIATICA: ICD-10-CM

## 2018-08-07 DIAGNOSIS — M62.81 MUSCLE WEAKNESS: ICD-10-CM

## 2018-08-07 DIAGNOSIS — G89.29 CHRONIC RIGHT-SIDED LOW BACK PAIN WITHOUT SCIATICA: ICD-10-CM

## 2018-08-07 PROCEDURE — 72148 MRI LUMBAR SPINE W/O DYE: CPT

## 2018-08-13 RX ORDER — DULOXETIN HYDROCHLORIDE 30 MG/1
30 CAPSULE, DELAYED RELEASE ORAL DAILY
Qty: 30 CAPSULE | Refills: 3 | Status: SHIPPED | OUTPATIENT
Start: 2018-08-13 | End: 2018-10-22 | Stop reason: ALTCHOICE

## 2018-10-22 ENCOUNTER — OFFICE VISIT (OUTPATIENT)
Dept: FAMILY MEDICINE CLINIC | Age: 28
End: 2018-10-22
Payer: COMMERCIAL

## 2018-10-22 VITALS
TEMPERATURE: 99.3 F | DIASTOLIC BLOOD PRESSURE: 68 MMHG | OXYGEN SATURATION: 98 % | RESPIRATION RATE: 16 BRPM | SYSTOLIC BLOOD PRESSURE: 126 MMHG | WEIGHT: 197 LBS | HEART RATE: 98 BPM | BODY MASS INDEX: 36.25 KG/M2 | HEIGHT: 62 IN

## 2018-10-22 DIAGNOSIS — L98.9 SKIN LESION OF FACE: Primary | ICD-10-CM

## 2018-10-22 DIAGNOSIS — T14.8XXA BRUISING: ICD-10-CM

## 2018-10-22 DIAGNOSIS — M51.36 L4-L5 DISC BULGE: ICD-10-CM

## 2018-10-22 PROCEDURE — 1036F TOBACCO NON-USER: CPT | Performed by: INTERNAL MEDICINE

## 2018-10-22 PROCEDURE — G8417 CALC BMI ABV UP PARAM F/U: HCPCS | Performed by: INTERNAL MEDICINE

## 2018-10-22 PROCEDURE — G8427 DOCREV CUR MEDS BY ELIG CLIN: HCPCS | Performed by: INTERNAL MEDICINE

## 2018-10-22 PROCEDURE — 99213 OFFICE O/P EST LOW 20 MIN: CPT | Performed by: INTERNAL MEDICINE

## 2018-10-22 PROCEDURE — G8484 FLU IMMUNIZE NO ADMIN: HCPCS | Performed by: INTERNAL MEDICINE

## 2018-10-22 RX ORDER — GABAPENTIN 300 MG/1
300 CAPSULE ORAL
Qty: 60 CAPSULE | Refills: 3 | Status: SHIPPED | OUTPATIENT
Start: 2018-10-22 | End: 2019-03-07 | Stop reason: SDUPTHER

## 2018-10-22 ASSESSMENT — ENCOUNTER SYMPTOMS
BACK PAIN: 1
COLOR CHANGE: 0
CONSTIPATION: 0
BLOOD IN STOOL: 0
DIARRHEA: 0
ABDOMINAL PAIN: 0

## 2018-10-22 NOTE — PROGRESS NOTES
Visit Information    Have you changed or started any medications since your last visit including any over-the-counter medicines, vitamins, or herbal medicines? no   Are you having any side effects from any of your medications? -  no  Have you stopped taking any of your medications? Is so, why? -  no    Have you seen any other physician or provider since your last visit? No  Have you had any other diagnostic tests since your last visit? No  Have you been seen in the emergency room and/or had an admission to a hospital since we last saw you? No  Have you had your routine dental cleaning in the past 6 months? no    Have you activated your [employer] account? If not, what are your barriers?  Yes     Patient Care Team:  Gal Cho DO as PCP - General (Family Medicine)  Gal Cho DO as PCP - [facility] Attributed Provider    Medical History Review  Past Medical, Family, and Social History reviewed and does contribute to the patient presenting condition    Health Maintenance   Topic Date Due    Flu vaccine (1) 09/01/2018    HIV screen  12/26/2018 (Originally 12/15/2005)    DTaP/Tdap/Td vaccine (1 - Tdap) 06/26/2019 (Originally 12/15/2009)    Cervical cancer screen  06/06/2021

## 2018-10-22 NOTE — PROGRESS NOTES
[facility] MEDICINE  [facility] [address]  [place] [address]  Dept: [phone]  Dept Fax: [phone]    Jean Hardy a 32 y.o. female who presents today for her medical conditions/complaints as notedbelow. Pancho Crystal is c/o of   Chief Complaint   Patient presents with    Lower Back Pain     ongoing, having trouble at work and sitting    Rash     bump by face, it will get itchy and scaly and then the skin will peel off and it will be red          HPI:     Lesion /spot on face ;is scratchy so will thcy or pick at it and then it will come back and bleeds a lot when she picks at it . Has not really got bigger in size . Is all the same color. No family hx of skin cancer. Also still having pain   Cymbalta not helping at all for back   Difficult to move heavy things at work [place] when she is by herself   Has been doing the home exercises almost daily w/o relief   Also does naproxen twice a day for knee but this does not help her back campos all   Already had mri   Also getting brusing since starting the cymbalta       Rash   Pertinent negatives include no diarrhea, fatigue or fever.        No results found for: LABA1C      ( goal A1C is < 7)   No results found for: LABMICR  LDL Cholesterol (mg/dL)   Date Value   06/26/2018 70       (goal LDL is <100)   AST (U/L)   Date Value   06/26/2018 19     ALT (U/L)   Date Value   06/26/2018 21     BUN (mg/dL)   Date Value   06/26/2018 7     BP Readings from Last 3 Encounters:   10/22/18 126/68   07/30/18 122/82   06/26/18 110/78          (goal 120/80)    Past Medical History:   Diagnosis Date    Osteoarthritis       Past Surgical History:   Procedure Laterality Date    WISDOM TOOTH EXTRACTION Bilateral 06/2018       Family History   Problem Relation Age of Onset    Heart Defect Mother         murmur    Diabetes Father        Social History   Substance Use Topics    Smoking status: Never Smoker    Smokeless

## 2018-11-02 ENCOUNTER — INITIAL CONSULT (OUTPATIENT)
Dept: PAIN MANAGEMENT | Age: 28
End: 2018-11-02
Payer: COMMERCIAL

## 2018-11-02 VITALS
DIASTOLIC BLOOD PRESSURE: 69 MMHG | WEIGHT: 193 LBS | SYSTOLIC BLOOD PRESSURE: 102 MMHG | BODY MASS INDEX: 35.51 KG/M2 | RESPIRATION RATE: 16 BRPM | HEART RATE: 91 BPM | OXYGEN SATURATION: 96 % | HEIGHT: 62 IN

## 2018-11-02 DIAGNOSIS — G89.29 CHRONIC BILATERAL LOW BACK PAIN WITH RIGHT-SIDED SCIATICA: Primary | ICD-10-CM

## 2018-11-02 DIAGNOSIS — E66.09 CLASS 2 OBESITY DUE TO EXCESS CALORIES WITHOUT SERIOUS COMORBIDITY WITH BODY MASS INDEX (BMI) OF 35.0 TO 35.9 IN ADULT: ICD-10-CM

## 2018-11-02 DIAGNOSIS — M54.41 CHRONIC BILATERAL LOW BACK PAIN WITH RIGHT-SIDED SCIATICA: Primary | ICD-10-CM

## 2018-11-02 DIAGNOSIS — M51.36 DDD (DEGENERATIVE DISC DISEASE), LUMBAR: ICD-10-CM

## 2018-11-02 PROCEDURE — G8427 DOCREV CUR MEDS BY ELIG CLIN: HCPCS | Performed by: ANESTHESIOLOGY

## 2018-11-02 PROCEDURE — G8417 CALC BMI ABV UP PARAM F/U: HCPCS | Performed by: ANESTHESIOLOGY

## 2018-11-02 PROCEDURE — G8484 FLU IMMUNIZE NO ADMIN: HCPCS | Performed by: ANESTHESIOLOGY

## 2018-11-02 PROCEDURE — 99244 OFF/OP CNSLTJ NEW/EST MOD 40: CPT | Performed by: ANESTHESIOLOGY

## 2018-11-02 RX ORDER — TIZANIDINE 4 MG/1
4 TABLET ORAL 2 TIMES DAILY
Qty: 60 TABLET | Refills: 1 | Status: SHIPPED | OUTPATIENT
Start: 2018-11-02 | End: 2019-01-10 | Stop reason: SDUPTHER

## 2018-11-02 ASSESSMENT — ENCOUNTER SYMPTOMS
GASTROINTESTINAL NEGATIVE: 1
BACK PAIN: 1
ALLERGIC/IMMUNOLOGIC NEGATIVE: 1
EYES NEGATIVE: 1
RESPIRATORY NEGATIVE: 1

## 2018-11-02 NOTE — COMMUNICATION BODY
Assessment: This is a pleasant 70-year-old female who works full-time and her job involves lifting heavy objects. She seen here today for evaluation of lower back and right leg pain onset of pain in December 2017. Onset was spontaneous and sudden. No associated injury with the onset of her pain. She woke up on morning with severe lower back and right lower extremity pain that led her to ER. Over last 10 months her pain has progressively worsened. Back pain is located in the lumbar area predominantly on the right side with radiation down right leg over gluteal region and posterior thigh up to the knee level. No significant radiation of pain below the knee level. No associated numbness or paresthesia. No history of fever chills or weight loss. No history of bladder or bowel incontinence. When aggravated pain can cross onto the other side in the lumbar area also. Aggravating factors include lifting twisting turning bending excessive walking and prolonged sitting. Alleviating factors include rest and hot showers    Patient have not done any physical therapy for her back pain  She had a recent MRI lumbar spine, report is available in Epic for review  No previous history of lumbar spine injection  No previous history of lumbar spine surgeries    Medications tried include Cymbalta, Neurontin and ibuprofen  Patient reports side effect of Cymbalta  Currently taking Neurontin and ibuprofen  pain is poorly controlled average intensity 5/10 at rest    EXAMINATION: MRI OF THE LUMBAR SPINE WITHOUT CONTRAST, 8/7/2018 7:15 am    L4-L5: Disc desiccation and circumferential disc bulge. There is mild indentation on the ventral thecal sac without significant central canal stenosis. Mild neural foraminal narrowing bilaterally. 1. Chronic bilateral low back pain with right-sided sciatica    2.  Class 2 obesity due to excess calories without serious comorbidity with body mass index (BMI) of 35.0 to 35.9 in

## 2018-11-02 NOTE — PROGRESS NOTES
light-headedness. Negative for tremors, seizures, syncope, facial asymmetry, speech difficulty, weakness, numbness and headaches. Hematological: Negative. Psychiatric/Behavioral: Positive for confusion. Negative for agitation, behavioral problems, decreased concentration, dysphoric mood, hallucinations, self-injury, sleep disturbance and suicidal ideas. The patient is nervous/anxious. The patient is not hyperactive. Objective:   Physical Exam   Constitutional: She is oriented to person, place, and time. She appears well-developed and well-nourished. No distress. HENT:   Head: Normocephalic and atraumatic. Eyes: Pupils are equal, round, and reactive to light. Conjunctivae and EOM are normal.   Cardiovascular: Normal rate and normal heart sounds. Pulmonary/Chest: Effort normal. No respiratory distress. Musculoskeletal:        Lumbar back: She exhibits decreased range of motion, tenderness and pain. Legs:  3 leg raise positive on right side   Neurological: She is alert and oriented to person, place, and time. She displays no atrophy and no tremor. No cranial nerve deficit or sensory deficit. She exhibits normal muscle tone. She displays no seizure activity. Coordination and gait normal.   Reflex Scores:       Patellar reflexes are 2+ on the right side and 2+ on the left side. Skin: Skin is warm and dry. Psychiatric: She has a normal mood and affect. Her behavior is normal. Judgment and thought content normal.   Nursing note and vitals reviewed. Assessment: This is a pleasant 61-year-old female who works full-time and her job involves lifting heavy objects. She seen here today for evaluation of lower back and right leg pain onset of pain in December 2017. Onset was spontaneous and sudden. No associated injury with the onset of her pain. She woke up on morning with severe lower back and right lower extremity pain that led her to ER.   Over last 10 months her pain has

## 2018-11-02 NOTE — LETTER
Si Units by Does not apply route daily PNEUMATIC OFFLOADING LUMBAR BRACE     Dispense:  1 each     Refill:  0    tiZANidine (ZANAFLEX) 4 MG tablet     Sig: Take 1 tablet by mouth 2 times daily     Dispense:  60 tablet     Refill:  1       If you have questions, please do not hesitate to call me. I look forward to following Julio Gee along with you.     Sincerely,        Regan Libman, MD

## 2018-11-12 ENCOUNTER — OFFICE VISIT (OUTPATIENT)
Dept: DERMATOLOGY | Age: 28
End: 2018-11-12
Payer: COMMERCIAL

## 2018-11-12 VITALS
WEIGHT: 196.8 LBS | OXYGEN SATURATION: 96 % | HEART RATE: 87 BPM | BODY MASS INDEX: 36.22 KG/M2 | HEIGHT: 62 IN | SYSTOLIC BLOOD PRESSURE: 120 MMHG | DIASTOLIC BLOOD PRESSURE: 78 MMHG

## 2018-11-12 DIAGNOSIS — B07.8 OTHER VIRAL WARTS: Primary | ICD-10-CM

## 2018-11-12 DIAGNOSIS — L30.1 DYSHIDROTIC ECZEMA: ICD-10-CM

## 2018-11-12 PROCEDURE — G8484 FLU IMMUNIZE NO ADMIN: HCPCS | Performed by: DERMATOLOGY

## 2018-11-12 PROCEDURE — G8417 CALC BMI ABV UP PARAM F/U: HCPCS | Performed by: DERMATOLOGY

## 2018-11-12 PROCEDURE — 1036F TOBACCO NON-USER: CPT | Performed by: DERMATOLOGY

## 2018-11-12 PROCEDURE — 99202 OFFICE O/P NEW SF 15 MIN: CPT | Performed by: DERMATOLOGY

## 2018-11-12 PROCEDURE — 17110 DESTRUCTION B9 LES UP TO 14: CPT | Performed by: DERMATOLOGY

## 2018-11-12 PROCEDURE — G8427 DOCREV CUR MEDS BY ELIG CLIN: HCPCS | Performed by: DERMATOLOGY

## 2018-11-12 NOTE — PROGRESS NOTES
Dermatology Patient Note  [facility] DERMATOLOGY  [address]  [address] [address]  Dept: [phone]  Dept Fax: [phone]      VISITDATE: 11/12/2018   REFERRING PROVIDER: Miri Jean DO      Domonique Gonzalez is a 32 y.o. female  who presents today in the office for:    New Patient (scaly, scabby area on the forehead x 5 months)      HISTORY OF PRESENT ILLNESS:  Patient presents for lesion  Location: left forehead  Duration: 5 months  Symptoms: crusts and falls off  Course: persistent  Exacerbating factors: unsure  Prior treatments: none    Also complains of itchy scaly hands and feet. 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 . 60 capsule 3    Elastic Bandages & Supports (LUMBAR BACK BRACE/SUPPORT PAD) MISC 1 each by Does not apply route daily as needed (back pain) 1 each 0    naproxen (NAPROSYN) 500 MG tablet Take 1 tablet by mouth 2 times daily (with meals) 60 tablet 3    linaclotide (LINZESS) 145 MCG capsule Take 1 capsule by mouth every morning (before breakfast) (Patient taking differently: Take 175 mcg by mouth every morning (before breakfast) ) 30 capsule 3     No current facility-administered medications for this visit.         ALLERGIES:   Allergies   Allergen Reactions    Latex Hives       SOCIAL HISTORY:  Social History   Substance Use Topics    Smoking status:

## 2018-11-14 ENCOUNTER — HOSPITAL ENCOUNTER (OUTPATIENT)
Dept: PHYSICAL THERAPY | Age: 28
Setting detail: THERAPIES SERIES
Discharge: HOME | End: 2018-11-14
Payer: COMMERCIAL

## 2018-11-14 PROCEDURE — 97110 THERAPEUTIC EXERCISES: CPT

## 2018-11-14 PROCEDURE — 97162 PT EVAL MOD COMPLEX 30 MIN: CPT

## 2018-11-14 PROCEDURE — 97140 MANUAL THERAPY 1/> REGIONS: CPT

## 2018-11-14 NOTE — CONSULTS
without   sciatica   TECHNOLOGIST PROVIDED HISTORY:   Reason for exam:->chronic  low back pain , muscle weakness       FINDINGS:   There is apparent transitional anatomy with sacralization of L5.  The T2   weighted sagittal images (series 401) has been labeled.  The lowest   rib-bearing vertebral body has been designated T12 for this study.       BONES/ALIGNMENT: There is normal alignment of the spine. The vertebral body   heights are maintained. The bone marrow signal appears unremarkable.       SPINAL CORD: The conus terminates normally.       SOFT TISSUES: No paraspinal mass identified.       L1-L2: There is no significant disc herniation, spinal canal stenosis or   neural foraminal narrowing.       L2-L3: There is no significant disc herniation, spinal canal stenosis or   neural foraminal narrowing.       L3-L4: There is no significant disc herniation, spinal canal stenosis or   neural foraminal narrowing.       L4-L5: Disc desiccation and circumferential disc bulge.  There is mild   indentation on the ventral thecal sac without significant central canal   stenosis.  Mild neural foraminal narrowing bilaterally.       L5-S1: There is no significant disc herniation, spinal canal stenosis or   neural foraminal narrowing.           Impression   Suspected transitional anatomy with spine labeling as described above.  The   lowest rib-bearing vertebral body has been designated T12.       Disc desiccation and circumferential disc bulge at L4-L5 resulting in mild   indentation upon the ventral thecal sac and minimal bilateral neural   foraminal narrowing.       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 Pain Rating: (0-10 scale) 6/10  Pain altered Tx:  [] Yes  [] No  Action:  Symptoms:  [] Improving [] Worsening [] Same  Better:  [] AM    [] PM    [] Sit    [] Rise/Sit    []Stand    [] Walk    [] Lying    [] Other:  Worse: [] AM    [] PM    [x] Sit    [x] Rise/Sit    []Stand    [] Walk    [] Lying    [] Bend                             [] Valsalva    [x] Other: Lifting paint into shaker, up to 90lb, awkward lifting position  Sleep: [] OK    [] Disturbed    Objective:      STRENGTH  STRENGTH  ROM    Left Right  Left Right Cervical    C5 Shld Abd   L1-2 Hip Flex   Flexion    Shld Flexion   Hip Abd   Extension    Shld IR   L3-4 Knee Ext   Rotation L R   Shld ER   L4 Ankle DF   Sidebend L R   C6 Elb Flex   L5 EHL   Retraction    C7 Elb Ext   S1 Plant. Flex   Lumbar    C8 EPL   Abdominals   Flexion Flexion 20%   T1 Fing Abd   Erector Spinae   Extension 75%         Rotation L 75% R75%         Sidebend L 75% R  75%         UE/LE                                                              Pelvic obliquity/hypomobility/derangement:  Left forward bend and Dial positive, left post rotated reji, left upslip    LE and core strength grossly 5- throughout    TESTS (+/-) LEFT RIGHT Not Tested   SLR [] sit [] supine   []   Hamstring (SLR)   []   SKTC   []   DKTC   []   Slump/Dural   []   SI JT   []   TRACIE   []   Joint Mobility   []   Cerv. Comp   []   Cerv. Distraction   []   Cerv. Alar/Transverse   []   Vertebral Artery   []   Adsons   []   Jerica Mya   []   Lor Tests ? Pain ?  Pain No Change Not Tested   RFIS [x] [] [] []   BYRON [] [x] [] []   RFIL [] [] [] []   REIL [] [] [] []   Rep Prot [] [] [] []   Rep Retract [] [] [] []       OBSERVATION No Deficit Deficit Not Tested Comments   Posture       Forward Head [] [] []    Rounded Shoulders [] [] []    Kyphosis

## 2018-11-20 ENCOUNTER — HOSPITAL ENCOUNTER (OUTPATIENT)
Dept: PHYSICAL THERAPY | Age: 28
Setting detail: THERAPIES SERIES
Discharge: HOME | End: 2018-11-20
Payer: COMMERCIAL

## 2018-11-20 PROCEDURE — 97110 THERAPEUTIC EXERCISES: CPT

## 2018-11-20 PROCEDURE — 97140 MANUAL THERAPY 1/> REGIONS: CPT

## 2018-11-20 NOTE — FLOWSHEET NOTE
[address] [facility]   [address] [facility] [address]   Phone: [phone]   Fax: [phone]    Physical Therapy Daily Treatment Note      Date:  2018  Patient Name:  Hi Mir    :  1990  MRN: [id number]  Physician: Jerome Cr MD                          Insurance: [facility]              Eligibility Status:  Eligible        DOS: 18  # of visits allowed/remainin  Source: Phone  Spoke Kayden Horvath [phone]  Reference: [id number]  Auth: NPRe     Electronically signed by Meg Donnelly on 18 at 12:31 PM  Medical Diagnosis: chronic LBP, right sciatica                    Rehab Codes: M54.5, M54.41, M62.561, M62.562  Onset Date: December 10, 2017                  Next 's appt. : tbd  Visit# / total visits: -  Cancels/No Shows: 0/0    Subjective:  No pain with HEP. No questions on HEP. Pain:  [x] Yes  [] No Location: Right low back  N/A Pain Rating: (0-10 scale) 5/10  Pain altered Tx:  [] No  [] Yes  Action:  Comments:    Objective:    Pelvic obliquity/hypomobility/derangement:  Left forward bend and stork positive, left upslip, left depressed pubic bone  Modalities:   Precautions:  Exercises:  Exercise Reps/ Time Weight/ Level Comments   MET stabs 5 sec x 10      Prone press up 5 sec x 10       TrA, Alt LE, Alt UE  10    HEP             Manual:  Left upslip correction, left post rotated reji correction 10 min       Other:HEP issued with HO, instruction, RD, copy in softchart     Specific Instructions for next treatment:  Core, LE strength, extension bias therex. Treatment Charges: Mins Units   []  Modalities     [x]  Ther Exercise 25 2   [x]  Manual Therapy 15 1   []  Ther Activities     []  Aquatics     []  Neuromuscular     []  Other     Total Treatment time 40 3       Assessment: [x] Progressing toward goals. MET assessment with pelvic obliquity corrections followed by MET stabs. Extension bias without any complaints.

## 2018-11-27 ENCOUNTER — HOSPITAL ENCOUNTER (OUTPATIENT)
Dept: PHYSICAL THERAPY | Age: 28
Setting detail: THERAPIES SERIES
Discharge: HOME | End: 2018-11-27
Payer: COMMERCIAL

## 2018-11-27 PROCEDURE — 97113 AQUATIC THERAPY/EXERCISES: CPT

## 2018-11-28 ENCOUNTER — HOSPITAL ENCOUNTER (OUTPATIENT)
Dept: PHYSICAL THERAPY | Age: 28
Setting detail: THERAPIES SERIES
Discharge: HOME | End: 2018-11-28
Payer: COMMERCIAL

## 2018-11-28 PROCEDURE — 97140 MANUAL THERAPY 1/> REGIONS: CPT

## 2018-11-28 PROCEDURE — 97110 THERAPEUTIC EXERCISES: CPT

## 2018-11-28 PROCEDURE — 97012 MECHANICAL TRACTION THERAPY: CPT

## 2018-12-04 ENCOUNTER — HOSPITAL ENCOUNTER (OUTPATIENT)
Dept: PHYSICAL THERAPY | Age: 28
Setting detail: THERAPIES SERIES
Discharge: HOME | End: 2018-12-04
Payer: COMMERCIAL

## 2018-12-04 ENCOUNTER — OFFICE VISIT (OUTPATIENT)
Dept: FAMILY MEDICINE CLINIC | Age: 28
End: 2018-12-04
Payer: COMMERCIAL

## 2018-12-04 VITALS
WEIGHT: 195.6 LBS | HEIGHT: 62 IN | BODY MASS INDEX: 35.99 KG/M2 | HEART RATE: 84 BPM | RESPIRATION RATE: 16 BRPM | OXYGEN SATURATION: 98 % | SYSTOLIC BLOOD PRESSURE: 134 MMHG | TEMPERATURE: 97.9 F | DIASTOLIC BLOOD PRESSURE: 82 MMHG

## 2018-12-04 DIAGNOSIS — M51.36 BULGE OF LUMBAR DISC WITHOUT MYELOPATHY: Primary | ICD-10-CM

## 2018-12-04 DIAGNOSIS — M25.561 CHRONIC PAIN OF RIGHT KNEE: ICD-10-CM

## 2018-12-04 DIAGNOSIS — G89.29 CHRONIC PAIN OF RIGHT KNEE: ICD-10-CM

## 2018-12-04 PROCEDURE — 97113 AQUATIC THERAPY/EXERCISES: CPT

## 2018-12-04 PROCEDURE — 99213 OFFICE O/P EST LOW 20 MIN: CPT | Performed by: INTERNAL MEDICINE

## 2018-12-04 PROCEDURE — G8427 DOCREV CUR MEDS BY ELIG CLIN: HCPCS | Performed by: INTERNAL MEDICINE

## 2018-12-04 PROCEDURE — G8417 CALC BMI ABV UP PARAM F/U: HCPCS | Performed by: INTERNAL MEDICINE

## 2018-12-04 PROCEDURE — 1036F TOBACCO NON-USER: CPT | Performed by: INTERNAL MEDICINE

## 2018-12-04 PROCEDURE — G8484 FLU IMMUNIZE NO ADMIN: HCPCS | Performed by: INTERNAL MEDICINE

## 2018-12-04 RX ORDER — TIZANIDINE 4 MG/1
4 TABLET ORAL EVERY 6 HOURS PRN
COMMUNITY
End: 2019-01-23

## 2018-12-04 ASSESSMENT — ENCOUNTER SYMPTOMS
BACK PAIN: 1
ABDOMINAL PAIN: 0
BOWEL INCONTINENCE: 0
DIARRHEA: 0
CONSTIPATION: 0

## 2018-12-04 NOTE — FLOWSHEET NOTE
[address] [facility]   [address] [facility] [address]   Phone: [phone]   Fax: [phone]    Physical Therapy Daily Treatment Note      Date:  2018  Patient Name:  Domonique Gonzalez    :  1990  MRN: [id number]  Physician: Khurram Vazquez MD                          Insurance: [facility]              Eligibility Status:  Eligible        DOS: 18  # of visits allowed/remainin  Source: Phone  Spoke Jeanette Metcalf [phone]  Reference: [id number]  Auth: NPRe     Electronically signed by Hillary Palafox on 18 at 12:31 PM  Medical Diagnosis: chronic LBP, right sciatica                    Rehab Codes: M54.5, M54.41, M62.561, M62.562  Onset Date: December 10, 2017                  Next 's appt. : tbd  Visit# / total visits: -  Cancels/No Shows: 0/0    Subjective:  Denies issues after last visit; feeling pretty good today. States she did not tolerate traction last visit- caused unbearable pain  Pain:  [x] Yes  [] No Location: Right low back; denies radicular symptoms  Pain Rating: (0-10 scale) 3/10  Pain altered Tx:  [x] No  [] Yes  Action:  Comments:Reviewed postural awareness and importance of core stability    Objective:    Modalities:   Precautions:  Exercises:    [address] [place] [address] Exercise Log  Aquatic, Hip & DLS Program- Phase 1    Date of Eval:   18                             Primary PT: Ed  Diagnosis: Things to Focus On (goals):   Surgical Precautions:  Medical Precautions:  [] C-9 dates  [] Occ Med   [] Medicare     Date 18      Visit # 3/12-16       Walk F/L/R 2 Laps 2 Laps      Marching 10x 2 Laps      Squats 10x5\" 12x5\"      Step-Ups F/L   Add     Heel-toe raises 10x 12x      SLR F/L/R 10x 12x      Knee/Flex/Ext        F/L Lunges        Kickboard Ex.  Med Med      Iso Abd. 10x5\" 10x5\"      Push-pull 10x 10x      Paddling                UE Format:   Add     Horiz Abd/Add        IR/ER

## 2018-12-04 NOTE — PROGRESS NOTES
12/15/2009)    Flu vaccine (1) 10/08/2019 (Originally 9/1/2018)    Cervical cancer screen  06/06/2021       Subjective:     Review of Systems   Constitutional: Negative for activity change, appetite change, chills, fatigue, fever and unexpected weight change. Eyes: Negative for visual disturbance. Cardiovascular: Negative for chest pain. Gastrointestinal: Negative for abdominal pain, bowel incontinence, constipation and diarrhea. Genitourinary: Negative for bladder incontinence, dysuria and pelvic pain. Musculoskeletal: Positive for arthralgias, back pain and gait problem. Negative for joint swelling, myalgias and neck pain. Skin: Negative for rash. Neurological: Negative for weakness, numbness, headaches and paresthesias. Psychiatric/Behavioral: Negative for sleep disturbance. Objective:     Physical Exam   Constitutional: She is oriented to person, place, and time. She appears well-developed and well-nourished. No distress. obese   Cardiovascular: Normal rate, regular rhythm and normal heart sounds. Pulmonary/Chest: Effort normal and breath sounds normal.   Abdominal: There is no splenomegaly or hepatomegaly. Musculoskeletal:        Lumbar back: She exhibits decreased range of motion and tenderness. Neurological: She is alert and oriented to person, place, and time. No cranial nerve deficit. Skin: Skin is warm and dry. No rash noted. She is not diaphoretic. Psychiatric: She has a normal mood and affect. Nursing note and vitals reviewed. /82 (Site: Left Upper Arm, Position: Sitting, Cuff Size: Medium Adult)   Pulse 84   Temp 97.9 °F (36.6 °C) (Tympanic)   Resp 16   Ht 5' 2\" (1.575 m)   Wt 195 lb 9.6 oz (88.7 kg)   LMP 10/26/2018 (Exact Date)   SpO2 98%   BMI 35.78 kg/m²     Assessment:       Diagnosis Orders   1. Bulge of lumbar disc without myelopathy     2.  Chronic pain of right knee               Plan:      Return if symptoms worsen or fail to

## 2018-12-05 ENCOUNTER — HOSPITAL ENCOUNTER (OUTPATIENT)
Dept: PHYSICAL THERAPY | Age: 28
Setting detail: THERAPIES SERIES
Discharge: HOME | End: 2018-12-05
Payer: COMMERCIAL

## 2018-12-05 PROCEDURE — 97110 THERAPEUTIC EXERCISES: CPT

## 2018-12-05 PROCEDURE — 97140 MANUAL THERAPY 1/> REGIONS: CPT

## 2018-12-12 ENCOUNTER — HOSPITAL ENCOUNTER (OUTPATIENT)
Dept: PHYSICAL THERAPY | Age: 28
Setting detail: THERAPIES SERIES
Discharge: HOME | End: 2018-12-12
Payer: COMMERCIAL

## 2018-12-12 PROCEDURE — 97110 THERAPEUTIC EXERCISES: CPT

## 2018-12-12 PROCEDURE — 97140 MANUAL THERAPY 1/> REGIONS: CPT

## 2018-12-12 NOTE — FLOWSHEET NOTE
10% or less        Patient goals:  Decrease pain    Pt. Education:  [x] Yes  [] No  [x] Reviewed Prior HEP/Ed  Method of Education: [x] Verbal  [x] Demo  [] Written  Comprehension of Education:  [x] Verbalizes understanding. [x] Demonstrates understanding. [] Needs review. [] Demonstrates/verbalizes HEP/Ed previously given. Plan: [x] Continue per plan of care.    [] Other:      Time In: 9889            Time Out: 1325    Electronically signed by:  Gonzalo Lacy PT

## 2018-12-17 ENCOUNTER — HOSPITAL ENCOUNTER (OUTPATIENT)
Dept: PHYSICAL THERAPY | Age: 28
Setting detail: THERAPIES SERIES
Discharge: HOME | End: 2018-12-17
Payer: COMMERCIAL

## 2018-12-17 PROCEDURE — 97110 THERAPEUTIC EXERCISES: CPT

## 2018-12-17 PROCEDURE — 97140 MANUAL THERAPY 1/> REGIONS: CPT

## 2018-12-17 NOTE — FLOWSHEET NOTE
[facility]   [address]   Phone: [phone]   Fax: [phone]    Physical Therapy Daily Treatment Note      Date:  2018  Patient Name:  Jayden Carrero    :  1990  MRN: [id number]  Physician: Kranthi Reina MD                          Insurance: [facility]              Eligibility Status:  Eligible        DOS: 18  # of visits allowed/remainin  Source: Phone  Spoke Jt White [phone]  Reference: [id number]  Auth: NPRe     Electronically signed by Lizette Gordon on 18 at 12:31 PM  Medical Diagnosis: chronic LBP, right sciatica                    Rehab Codes: M54.5, M54.41, M62.561, M62.562  Onset Date: December 10, 2017                  Next 's appt. : tbd  Visit# / total visits: -               Cancels/No Shows: 0/0       Subjective:  Doing well right now. I was hurting this morning and took medicine  Pain:  [x] Yes  [] No Location: right low back, no radic N/A Pain Rating: (0-10 scale) 7/10  Pain altered Tx:  [] No  [] Yes  Action:  Comments:Dry Needling POC signed by physician. Dry Needling Consent form reviewed by patient, signed and witnessed. Skin prepped with alcohol; standard procedures followed.     Objective:    Pelvic obliquity/hypomobility/derangement:  Left upslip, left forward bend and Stork positive, posteriro rotated reji  Modalities:   Precautions:  Exercises:  Exercise Reps/ Time Weight/ Level Comments   MET stabs 5 sec x 10      Prone press up 5 sec x 10       TrA/alt UE/alt LE  10        Prone hip ext/superman  10 each    HEP, performed without pain   Manual:      Dry Needling (see below) 10 min       Other:HEP issued with HO, instruction, RD, copy in softchart    Homeostatic Point Right Left 0.5\" needle 1.0\" needle 2.0\" needle 3.0\" needle   Supraorbital []  []  []  []  []  []    Infraorbital  []  []  []  []  []  []    Lateral Pectoral []  []  []  []  []  []    Saphenous  []  []

## 2018-12-19 ENCOUNTER — HOSPITAL ENCOUNTER (OUTPATIENT)
Dept: PHYSICAL THERAPY | Age: 28
Setting detail: THERAPIES SERIES
Discharge: HOME | End: 2018-12-19
Payer: COMMERCIAL

## 2018-12-19 PROCEDURE — 97113 AQUATIC THERAPY/EXERCISES: CPT

## 2018-12-19 NOTE — FLOWSHEET NOTE
[address] [facility]   [address]   Phone: [phone]   Fax: [phone]    Physical Therapy Daily Treatment Note      Date:  2018  Patient Name:  Dilia Boswell    :  1990  MRN: [id number]  Physician: Jeremie Jovel MD                          Insurance: [facility]              Eligibility Status:  Eligible        DOS: 18  # of visits allowed/remainin  Source: Phone  Spoke Thomas Wilder [phone]  Reference: [id number]  Auth: NPRe     Electronically signed by Yesy Xanders on 18 at 12:31 PM  Medical Diagnosis: chronic LBP, right sciatica                    Rehab Codes: M54.5, M54.41, M62.561, M62.562  Onset Date: December 10, 2017                  Next 's appt. : tbd  Visit# / total visits: -  Cancels/No Shows: 0/0    Subjective:  States today is a bad day. Increased pain in the right side of her lower back- \"I do have good days, but I still have pain every day. \"    Pain:  [x] Yes  [] No Location: Right low back; denies radicular symptoms  Pain Rating: (0-10 scale) 6-7/10  Pain altered Tx:  [] No  [x] Yes  Action: Encouraged patient to work to her tolerance in a pain free range- exercises completed per log    Comments:Reviewed postural awareness and importance of core stability      Objective:  Modalities:   Precautions:  Exercises:    [address] [facility] Exercise Log  Aquatic, Hip & DLS Program- Phase 1    Date of Eval:   18                             Primary PT: Ed  Diagnosis:   Things to Focus On (goals):   Surgical Precautions:  Medical Precautions:  [] C-9 dates  [] Occ Med   [] Medicare     Date 18     Visit # 3/12-16 5/12-16 9/12-16     Walk F/L/R 2 Laps 2 Laps 2 laps ea     Marching 10x 2 Laps 2 laps     Squats 10x5\" 12x5\" 10x5\"     Step-Ups F/L    Add    Heel-toe raises 10x 12x 10x     SLR F/L/R 10x 12x 10x ea     Knee/Flex/Ext        F/L Lunges

## 2019-01-07 DIAGNOSIS — M25.561 CHRONIC PAIN OF RIGHT KNEE: ICD-10-CM

## 2019-01-07 DIAGNOSIS — G89.29 CHRONIC RIGHT HIP PAIN: ICD-10-CM

## 2019-01-07 DIAGNOSIS — G89.29 CHRONIC PAIN OF RIGHT KNEE: ICD-10-CM

## 2019-01-07 DIAGNOSIS — M25.551 CHRONIC RIGHT HIP PAIN: ICD-10-CM

## 2019-01-07 RX ORDER — NAPROXEN 500 MG/1
500 TABLET ORAL 2 TIMES DAILY WITH MEALS
Qty: 60 TABLET | Refills: 3 | Status: SHIPPED | OUTPATIENT
Start: 2019-01-07 | End: 2020-08-02 | Stop reason: SDUPTHER

## 2019-01-10 ENCOUNTER — HOSPITAL ENCOUNTER (OUTPATIENT)
Dept: PHYSICAL THERAPY | Age: 29
Setting detail: THERAPIES SERIES
Discharge: HOME | End: 2019-01-10
Payer: COMMERCIAL

## 2019-01-10 ENCOUNTER — OFFICE VISIT (OUTPATIENT)
Dept: PAIN MANAGEMENT | Age: 29
End: 2019-01-10
Payer: COMMERCIAL

## 2019-01-10 VITALS
OXYGEN SATURATION: 95 % | BODY MASS INDEX: 36.33 KG/M2 | HEIGHT: 62 IN | WEIGHT: 197.4 LBS | SYSTOLIC BLOOD PRESSURE: 90 MMHG | TEMPERATURE: 98.3 F | RESPIRATION RATE: 16 BRPM | DIASTOLIC BLOOD PRESSURE: 54 MMHG | HEART RATE: 81 BPM

## 2019-01-10 DIAGNOSIS — M54.41 CHRONIC BILATERAL LOW BACK PAIN WITH RIGHT-SIDED SCIATICA: Primary | ICD-10-CM

## 2019-01-10 DIAGNOSIS — M51.36 BULGE OF LUMBAR DISC WITHOUT MYELOPATHY: ICD-10-CM

## 2019-01-10 DIAGNOSIS — G89.29 CHRONIC BILATERAL LOW BACK PAIN WITH RIGHT-SIDED SCIATICA: Primary | ICD-10-CM

## 2019-01-10 PROCEDURE — 99214 OFFICE O/P EST MOD 30 MIN: CPT | Performed by: ANESTHESIOLOGY

## 2019-01-10 PROCEDURE — G8417 CALC BMI ABV UP PARAM F/U: HCPCS | Performed by: ANESTHESIOLOGY

## 2019-01-10 PROCEDURE — 1036F TOBACCO NON-USER: CPT | Performed by: ANESTHESIOLOGY

## 2019-01-10 PROCEDURE — G8484 FLU IMMUNIZE NO ADMIN: HCPCS | Performed by: ANESTHESIOLOGY

## 2019-01-10 PROCEDURE — G8427 DOCREV CUR MEDS BY ELIG CLIN: HCPCS | Performed by: ANESTHESIOLOGY

## 2019-01-10 RX ORDER — TIZANIDINE 4 MG/1
4 TABLET ORAL 2 TIMES DAILY
Qty: 60 TABLET | Refills: 1 | Status: SHIPPED | OUTPATIENT
Start: 2019-01-10 | End: 2019-03-07 | Stop reason: SDUPTHER

## 2019-01-10 ASSESSMENT — ENCOUNTER SYMPTOMS
BACK PAIN: 1
RESPIRATORY NEGATIVE: 1

## 2019-01-23 ENCOUNTER — HOSPITAL ENCOUNTER (OUTPATIENT)
Dept: PAIN MANAGEMENT | Age: 29
Discharge: HOME | End: 2019-01-23
Payer: COMMERCIAL

## 2019-01-23 VITALS
DIASTOLIC BLOOD PRESSURE: 71 MMHG | HEART RATE: 77 BPM | TEMPERATURE: 98 F | BODY MASS INDEX: 36.25 KG/M2 | WEIGHT: 197 LBS | OXYGEN SATURATION: 98 % | RESPIRATION RATE: 16 BRPM | HEIGHT: 62 IN | SYSTOLIC BLOOD PRESSURE: 110 MMHG

## 2019-01-23 DIAGNOSIS — M54.41 CHRONIC BILATERAL LOW BACK PAIN WITH RIGHT-SIDED SCIATICA: Chronic | ICD-10-CM

## 2019-01-23 DIAGNOSIS — G89.29 CHRONIC BACK PAIN, UNSPECIFIED BACK LOCATION, UNSPECIFIED BACK PAIN LATERALITY: ICD-10-CM

## 2019-01-23 DIAGNOSIS — M54.9 CHRONIC BACK PAIN, UNSPECIFIED BACK LOCATION, UNSPECIFIED BACK PAIN LATERALITY: ICD-10-CM

## 2019-01-23 DIAGNOSIS — M51.36 BULGE OF LUMBAR DISC WITHOUT MYELOPATHY: Primary | ICD-10-CM

## 2019-01-23 DIAGNOSIS — G89.29 CHRONIC BILATERAL LOW BACK PAIN WITH RIGHT-SIDED SCIATICA: Chronic | ICD-10-CM

## 2019-01-23 LAB — HCG, PREGNANCY URINE (POC): NEGATIVE

## 2019-01-23 PROCEDURE — 84703 CHORIONIC GONADOTROPIN ASSAY: CPT

## 2019-01-23 PROCEDURE — 64483 NJX AA&/STRD TFRM EPI L/S 1: CPT

## 2019-01-23 PROCEDURE — 6360000002 HC RX W HCPCS: Performed by: ANESTHESIOLOGY

## 2019-01-23 PROCEDURE — 64484 NJX AA&/STRD TFRM EPI L/S EA: CPT | Performed by: ANESTHESIOLOGY

## 2019-01-23 PROCEDURE — 64483 NJX AA&/STRD TFRM EPI L/S 1: CPT | Performed by: ANESTHESIOLOGY

## 2019-01-23 PROCEDURE — 64484 NJX AA&/STRD TFRM EPI L/S EA: CPT

## 2019-01-23 RX ORDER — FENTANYL CITRATE 50 UG/ML
INJECTION, SOLUTION INTRAMUSCULAR; INTRAVENOUS
Status: COMPLETED | OUTPATIENT
Start: 2019-01-23 | End: 2019-01-23

## 2019-01-23 RX ORDER — MIDAZOLAM HYDROCHLORIDE 1 MG/ML
INJECTION INTRAMUSCULAR; INTRAVENOUS
Status: COMPLETED | OUTPATIENT
Start: 2019-01-23 | End: 2019-01-23

## 2019-01-23 RX ADMIN — FENTANYL CITRATE 100 MCG: 50 INJECTION INTRAMUSCULAR; INTRAVENOUS at 10:40

## 2019-01-23 RX ADMIN — MIDAZOLAM HYDROCHLORIDE 2 MG: 1 INJECTION, SOLUTION INTRAMUSCULAR; INTRAVENOUS at 10:40

## 2019-01-23 ASSESSMENT — PAIN - FUNCTIONAL ASSESSMENT
PAIN_FUNCTIONAL_ASSESSMENT: 0-10
PAIN_FUNCTIONAL_ASSESSMENT: 0-10
PAIN_FUNCTIONAL_ASSESSMENT: PREVENTS OR INTERFERES SOME ACTIVE ACTIVITIES AND ADLS

## 2019-01-23 ASSESSMENT — PAIN DESCRIPTION - DESCRIPTORS: DESCRIPTORS: ACHING;CONSTANT

## 2019-02-06 ENCOUNTER — HOSPITAL ENCOUNTER (OUTPATIENT)
Dept: PAIN MANAGEMENT | Age: 29
Discharge: HOME | End: 2019-02-06
Payer: COMMERCIAL

## 2019-02-06 VITALS
DIASTOLIC BLOOD PRESSURE: 60 MMHG | OXYGEN SATURATION: 99 % | TEMPERATURE: 98.1 F | SYSTOLIC BLOOD PRESSURE: 104 MMHG | HEART RATE: 85 BPM | WEIGHT: 194 LBS | BODY MASS INDEX: 35.7 KG/M2 | RESPIRATION RATE: 14 BRPM | HEIGHT: 62 IN

## 2019-02-06 DIAGNOSIS — M54.41 CHRONIC BILATERAL LOW BACK PAIN WITH RIGHT-SIDED SCIATICA: Primary | Chronic | ICD-10-CM

## 2019-02-06 DIAGNOSIS — G89.29 CHRONIC BILATERAL LOW BACK PAIN WITH RIGHT-SIDED SCIATICA: Primary | Chronic | ICD-10-CM

## 2019-02-06 DIAGNOSIS — M51.36 BULGE OF LUMBAR DISC WITHOUT MYELOPATHY: ICD-10-CM

## 2019-02-06 DIAGNOSIS — M54.9 CHRONIC BACK PAIN, UNSPECIFIED BACK LOCATION, UNSPECIFIED BACK PAIN LATERALITY: ICD-10-CM

## 2019-02-06 DIAGNOSIS — G89.29 CHRONIC BACK PAIN, UNSPECIFIED BACK LOCATION, UNSPECIFIED BACK PAIN LATERALITY: ICD-10-CM

## 2019-02-06 LAB — HCG, PREGNANCY URINE (POC): NEGATIVE

## 2019-02-06 PROCEDURE — 64483 NJX AA&/STRD TFRM EPI L/S 1: CPT | Performed by: ANESTHESIOLOGY

## 2019-02-06 PROCEDURE — 84703 CHORIONIC GONADOTROPIN ASSAY: CPT

## 2019-02-06 PROCEDURE — 2500000003 HC RX 250 WO HCPCS

## 2019-02-06 PROCEDURE — 6360000002 HC RX W HCPCS: Performed by: ANESTHESIOLOGY

## 2019-02-06 PROCEDURE — 64484 NJX AA&/STRD TFRM EPI L/S EA: CPT | Performed by: ANESTHESIOLOGY

## 2019-02-06 PROCEDURE — 64484 NJX AA&/STRD TFRM EPI L/S EA: CPT

## 2019-02-06 PROCEDURE — 6360000002 HC RX W HCPCS

## 2019-02-06 PROCEDURE — 6360000004 HC RX CONTRAST MEDICATION

## 2019-02-06 PROCEDURE — 64483 NJX AA&/STRD TFRM EPI L/S 1: CPT

## 2019-02-06 RX ORDER — ONDANSETRON 2 MG/ML
4 INJECTION INTRAMUSCULAR; INTRAVENOUS ONCE
Status: COMPLETED | OUTPATIENT
Start: 2019-02-06 | End: 2019-02-06

## 2019-02-06 RX ORDER — FENTANYL CITRATE 50 UG/ML
INJECTION, SOLUTION INTRAMUSCULAR; INTRAVENOUS
Status: COMPLETED | OUTPATIENT
Start: 2019-02-06 | End: 2019-02-06

## 2019-02-06 RX ORDER — MIDAZOLAM HYDROCHLORIDE 1 MG/ML
INJECTION INTRAMUSCULAR; INTRAVENOUS
Status: COMPLETED | OUTPATIENT
Start: 2019-02-06 | End: 2019-02-06

## 2019-02-06 RX ADMIN — FENTANYL CITRATE 50 MCG: 50 INJECTION INTRAMUSCULAR; INTRAVENOUS at 13:02

## 2019-02-06 RX ADMIN — ONDANSETRON 4 MG: 2 INJECTION INTRAMUSCULAR; INTRAVENOUS at 12:52

## 2019-02-06 RX ADMIN — MIDAZOLAM HYDROCHLORIDE 2 MG: 1 INJECTION, SOLUTION INTRAMUSCULAR; INTRAVENOUS at 13:02

## 2019-02-06 RX ADMIN — FENTANYL CITRATE 50 MCG: 50 INJECTION INTRAMUSCULAR; INTRAVENOUS at 13:08

## 2019-02-06 ASSESSMENT — PAIN - FUNCTIONAL ASSESSMENT
PAIN_FUNCTIONAL_ASSESSMENT: 0-10
PAIN_FUNCTIONAL_ASSESSMENT: 0-10
PAIN_FUNCTIONAL_ASSESSMENT: ACTIVITIES ARE NOT PREVENTED
PAIN_FUNCTIONAL_ASSESSMENT: 0-10

## 2019-02-06 ASSESSMENT — PAIN DESCRIPTION - DESCRIPTORS: DESCRIPTORS: ACHING

## 2019-03-07 ENCOUNTER — OFFICE VISIT (OUTPATIENT)
Dept: PAIN MANAGEMENT | Age: 29
End: 2019-03-07
Payer: COMMERCIAL

## 2019-03-07 VITALS
BODY MASS INDEX: 36.02 KG/M2 | HEIGHT: 61 IN | SYSTOLIC BLOOD PRESSURE: 94 MMHG | RESPIRATION RATE: 16 BRPM | TEMPERATURE: 97.3 F | OXYGEN SATURATION: 100 % | DIASTOLIC BLOOD PRESSURE: 64 MMHG | HEART RATE: 89 BPM | WEIGHT: 190.8 LBS

## 2019-03-07 DIAGNOSIS — G89.29 CHRONIC BILATERAL LOW BACK PAIN WITH RIGHT-SIDED SCIATICA: ICD-10-CM

## 2019-03-07 DIAGNOSIS — Z92.241 S/P EPIDURAL STEROID INJECTION: Primary | ICD-10-CM

## 2019-03-07 DIAGNOSIS — M51.36 BULGE OF LUMBAR DISC WITHOUT MYELOPATHY: ICD-10-CM

## 2019-03-07 DIAGNOSIS — M54.41 CHRONIC BILATERAL LOW BACK PAIN WITH RIGHT-SIDED SCIATICA: ICD-10-CM

## 2019-03-07 PROCEDURE — 1036F TOBACCO NON-USER: CPT | Performed by: ANESTHESIOLOGY

## 2019-03-07 PROCEDURE — G8484 FLU IMMUNIZE NO ADMIN: HCPCS | Performed by: ANESTHESIOLOGY

## 2019-03-07 PROCEDURE — 99213 OFFICE O/P EST LOW 20 MIN: CPT | Performed by: ANESTHESIOLOGY

## 2019-03-07 PROCEDURE — G8417 CALC BMI ABV UP PARAM F/U: HCPCS | Performed by: ANESTHESIOLOGY

## 2019-03-07 PROCEDURE — G8427 DOCREV CUR MEDS BY ELIG CLIN: HCPCS | Performed by: ANESTHESIOLOGY

## 2019-03-07 RX ORDER — GABAPENTIN 300 MG/1
300 CAPSULE ORAL
Qty: 60 CAPSULE | Refills: 1 | Status: SHIPPED | OUTPATIENT
Start: 2019-03-07 | End: 2019-05-02 | Stop reason: SDUPTHER

## 2019-03-07 RX ORDER — TIZANIDINE 4 MG/1
4 TABLET ORAL 2 TIMES DAILY
Qty: 60 TABLET | Refills: 1 | Status: SHIPPED | OUTPATIENT
Start: 2019-03-07 | End: 2019-05-02 | Stop reason: ALTCHOICE

## 2019-03-07 ASSESSMENT — ENCOUNTER SYMPTOMS
RESPIRATORY NEGATIVE: 1
BACK PAIN: 1

## 2019-05-02 ENCOUNTER — OFFICE VISIT (OUTPATIENT)
Dept: PAIN MANAGEMENT | Age: 29
End: 2019-05-02
Payer: COMMERCIAL

## 2019-05-02 VITALS
DIASTOLIC BLOOD PRESSURE: 77 MMHG | HEART RATE: 65 BPM | SYSTOLIC BLOOD PRESSURE: 115 MMHG | OXYGEN SATURATION: 100 % | RESPIRATION RATE: 16 BRPM | HEIGHT: 62 IN | WEIGHT: 179.4 LBS | BODY MASS INDEX: 33.01 KG/M2

## 2019-05-02 DIAGNOSIS — M51.36 BULGE OF LUMBAR DISC WITHOUT MYELOPATHY: ICD-10-CM

## 2019-05-02 DIAGNOSIS — M54.41 CHRONIC BILATERAL LOW BACK PAIN WITH RIGHT-SIDED SCIATICA: Primary | Chronic | ICD-10-CM

## 2019-05-02 DIAGNOSIS — G89.29 CHRONIC BILATERAL LOW BACK PAIN WITH RIGHT-SIDED SCIATICA: Primary | Chronic | ICD-10-CM

## 2019-05-02 PROBLEM — N94.10 DYSPAREUNIA IN FEMALE: Status: ACTIVE | Noted: 2018-06-06

## 2019-05-02 PROCEDURE — G8427 DOCREV CUR MEDS BY ELIG CLIN: HCPCS | Performed by: ANESTHESIOLOGY

## 2019-05-02 PROCEDURE — G8417 CALC BMI ABV UP PARAM F/U: HCPCS | Performed by: ANESTHESIOLOGY

## 2019-05-02 PROCEDURE — 1036F TOBACCO NON-USER: CPT | Performed by: ANESTHESIOLOGY

## 2019-05-02 PROCEDURE — 99214 OFFICE O/P EST MOD 30 MIN: CPT | Performed by: ANESTHESIOLOGY

## 2019-05-02 RX ORDER — DULOXETIN HYDROCHLORIDE 20 MG/1
20 CAPSULE, DELAYED RELEASE ORAL DAILY
Qty: 30 CAPSULE | Refills: 1 | Status: SHIPPED | OUTPATIENT
Start: 2019-05-02 | End: 2019-07-08 | Stop reason: SDUPTHER

## 2019-05-02 RX ORDER — BACLOFEN 10 MG/1
10 TABLET ORAL 2 TIMES DAILY
Qty: 60 TABLET | Refills: 1 | Status: SHIPPED | OUTPATIENT
Start: 2019-05-02 | End: 2019-07-08 | Stop reason: SDUPTHER

## 2019-05-02 RX ORDER — GABAPENTIN 400 MG/1
300 CAPSULE ORAL 3 TIMES DAILY
Qty: 90 CAPSULE | Refills: 1 | Status: SHIPPED | OUTPATIENT
Start: 2019-05-02 | End: 2019-07-08 | Stop reason: SDUPTHER

## 2019-05-02 ASSESSMENT — ENCOUNTER SYMPTOMS
BACK PAIN: 1
RESPIRATORY NEGATIVE: 1

## 2019-05-02 NOTE — PROGRESS NOTES
Subjective:      Patient ID: Abby Peres is a 29 y.o. female. HPI   Chief Complaint   Patient presents with    Back Pain    Medication Refill     This is a pleasant 51-year-old woman who was seen about 3 months back in my clinic for evaluation of chronic back pain. Onset of symptoms more than 1 year ago  Pain is located in the lumbar area with radiation intermittently down the right leg  She was diagnosed with degenerative disc disease and lumbar radiculitis  Patient completed physical therapy without any significant improvement in symptoms  She had 2 lumbar epidural steroid injection, last injection was March 2019  Patient had significant improvement daily about 8D percent in leg pain  She continued to have some residual back pain    Pain is located in the lumbar area aggravates with work and excessive physical activity  Currently taking gabapentin 300 mg twice a day along with Zanaflex 4 mg at bedtime  She denies any side effects from the medication  States that the pain is not adequately controlled and requesting changes in medication. No radiation of pain significantly in legs at this time  Average pain score 2-4/10  No loss of bladder or bowel control      Medication Refill: Zanaflex     Pain score Today:  2  Adverse effects (Constipation / Nausea / Sedation / sexual Dysfunction / others) : No but she has been ill from different things so not sure if anything is from the meds. Mood: fair  Sleep pattern and quality: fair to poor  Activity level: good    Pill count Today: No patient did not bring   Last dose taken: 5/1/19  OARRS report reviewed today: yes  ER/Hospitalizations/PCP visit related to pain since last visit:No   Any legal problems e.g. DUI etc.:No  Satisfied with current management: not sure because of the medicine.      Opioid Contract:None  Last Urine Dug screen dated:None    Past Medical History, Past Surgical History, Social History, Allergies and Medications reviewed and updated in EPIC as indicated      Review of Systems   Constitutional: Negative. HENT: Negative. Respiratory: Negative. Musculoskeletal: Positive for back pain. Negative for arthralgias, gait problem, joint swelling, myalgias, neck pain and neck stiffness. Past Medical History:   Diagnosis Date    Osteoarthritis      Past Surgical History:   Procedure Laterality Date    NERVE BLOCK  01/23/2019    lumbar epidural right, decadron 10    NERVE BLOCK Right 02/06/2019    right lumbar epidural- decadron 10 mg    WISDOM TOOTH EXTRACTION Bilateral 06/2018     Allergies   Allergen Reactions    Latex Hives     Current Outpatient Medications   Medication Sig Dispense Refill    gabapentin (NEURONTIN) 400 MG capsule Take 1 capsule by mouth 3 times daily for 30 days. 90 capsule 1    baclofen (LIORESAL) 10 MG tablet Take 1 tablet by mouth 2 times daily 60 tablet 1    DULoxetine (CYMBALTA) 20 MG extended release capsule Take 1 capsule by mouth daily 30 capsule 1    naproxen (NAPROSYN) 500 MG tablet Take 1 tablet by mouth 2 times daily (with meals) 60 tablet 3    triamcinolone (KENALOG) 0.1 % ointment Apply to rash twice daily (not face, armpit or groin) 80 g 2    Elastic Bandages & Supports (LUMBAR BACK BRACE/SUPPORT PAD) MISC 1 Units by Does not apply route daily PNEUMATIC OFFLOADING LUMBAR BRACE 1 each 0    Elastic Bandages & Supports (LUMBAR BACK BRACE/SUPPORT PAD) MISC 1 each by Does not apply route daily as needed (back pain) 1 each 0    linaclotide (LINZESS) 145 MCG capsule Take 1 capsule by mouth every morning (before breakfast) (Patient taking differently: Take 175 mcg by mouth every morning (before breakfast) 290 mcg daily) 30 capsule 3     No current facility-administered medications for this visit.         Social History     Socioeconomic History    Marital status: Legally      Spouse name: None    Number of children: None    Years of education: None    Highest education level: None Occupational History    None   Social Needs    Financial resource strain: None    Food insecurity:     Worry: None     Inability: None    Transportation needs:     Medical: None     Non-medical: None   Tobacco Use    Smoking status: Never Smoker    Smokeless tobacco: Never Used   Substance and Sexual Activity    Alcohol use: Yes     Comment: socially    Drug use: No    Sexual activity: Yes   Lifestyle    Physical activity:     Days per week: None     Minutes per session: None    Stress: None   Relationships    Social connections:     Talks on phone: None     Gets together: None     Attends Mandaen service: None     Active member of club or organization: None     Attends meetings of clubs or organizations: None     Relationship status: None    Intimate partner violence:     Fear of current or ex partner: None     Emotionally abused: None     Physically abused: None     Forced sexual activity: None   Other Topics Concern    None   Social History Narrative    None       Family History   Problem Relation Age of Onset    Heart Defect Mother         murmur    Diabetes Father        Objective:   Physical Exam   Constitutional: She is oriented to person, place, and time. She appears well-developed and well-nourished. No distress. HENT:   Head: Normocephalic and atraumatic. Eyes: Pupils are equal, round, and reactive to light. Conjunctivae and EOM are normal. Right eye exhibits no discharge. Left eye exhibits no discharge. Neck: Normal range of motion. Neck supple. Cardiovascular: Normal rate, regular rhythm and normal heart sounds. Pulmonary/Chest: Effort normal and breath sounds normal.   Musculoskeletal:        Lumbar back: She exhibits tenderness and pain. Neurological: She is alert and oriented to person, place, and time. No cranial nerve deficit. Coordination normal.   Skin: Skin is warm and dry. No rash noted. No erythema. Psychiatric: She has a normal mood and affect.  Her behavior

## 2019-07-08 ENCOUNTER — OFFICE VISIT (OUTPATIENT)
Dept: PAIN MANAGEMENT | Age: 29
End: 2019-07-08
Payer: COMMERCIAL

## 2019-07-08 VITALS
WEIGHT: 179 LBS | DIASTOLIC BLOOD PRESSURE: 77 MMHG | BODY MASS INDEX: 32.94 KG/M2 | SYSTOLIC BLOOD PRESSURE: 115 MMHG | HEIGHT: 62 IN

## 2019-07-08 DIAGNOSIS — M51.36 BULGE OF LUMBAR DISC WITHOUT MYELOPATHY: ICD-10-CM

## 2019-07-08 DIAGNOSIS — M54.41 CHRONIC BILATERAL LOW BACK PAIN WITH RIGHT-SIDED SCIATICA: Chronic | ICD-10-CM

## 2019-07-08 DIAGNOSIS — G89.29 CHRONIC BILATERAL LOW BACK PAIN WITH RIGHT-SIDED SCIATICA: Chronic | ICD-10-CM

## 2019-07-08 PROCEDURE — 99214 OFFICE O/P EST MOD 30 MIN: CPT | Performed by: NURSE PRACTITIONER

## 2019-07-08 RX ORDER — BACLOFEN 10 MG/1
10 TABLET ORAL 2 TIMES DAILY
Qty: 60 TABLET | Refills: 2 | Status: SHIPPED | OUTPATIENT
Start: 2019-07-08 | End: 2019-08-07

## 2019-07-08 RX ORDER — DULOXETIN HYDROCHLORIDE 20 MG/1
20 CAPSULE, DELAYED RELEASE ORAL DAILY
Qty: 30 CAPSULE | Refills: 2 | Status: SHIPPED | OUTPATIENT
Start: 2019-07-08 | End: 2019-10-07 | Stop reason: SDUPTHER

## 2019-07-08 RX ORDER — GABAPENTIN 400 MG/1
400 CAPSULE ORAL 3 TIMES DAILY
Qty: 90 CAPSULE | Refills: 2 | Status: SHIPPED | OUTPATIENT
Start: 2019-07-08 | End: 2019-10-07 | Stop reason: SDUPTHER

## 2019-07-08 ASSESSMENT — ENCOUNTER SYMPTOMS
BACK PAIN: 1
RESPIRATORY NEGATIVE: 1

## 2019-07-09 ENCOUNTER — HOSPITAL ENCOUNTER (OUTPATIENT)
Age: 29
Setting detail: SPECIMEN
Discharge: HOME | End: 2019-07-09
Payer: COMMERCIAL

## 2019-07-09 ENCOUNTER — OFFICE VISIT (OUTPATIENT)
Dept: DERMATOLOGY | Age: 29
End: 2019-07-09
Payer: COMMERCIAL

## 2019-07-09 VITALS
OXYGEN SATURATION: 97 % | SYSTOLIC BLOOD PRESSURE: 113 MMHG | HEART RATE: 91 BPM | BODY MASS INDEX: 31.45 KG/M2 | HEIGHT: 61 IN | DIASTOLIC BLOOD PRESSURE: 79 MMHG | WEIGHT: 166.6 LBS

## 2019-07-09 DIAGNOSIS — L50.9 URTICARIA: ICD-10-CM

## 2019-07-09 DIAGNOSIS — D18.01 CHERRY ANGIOMA: ICD-10-CM

## 2019-07-09 DIAGNOSIS — D18.01 CHERRY ANGIOMA: Primary | ICD-10-CM

## 2019-07-09 DIAGNOSIS — L30.1 DYSHIDROTIC ECZEMA: ICD-10-CM

## 2019-07-09 LAB
ABSOLUTE EOS #: 0.12 K/UL (ref 0–0.44)
ABSOLUTE IMMATURE GRANULOCYTE: <0.03 K/UL (ref 0–0.3)
ABSOLUTE LYMPH #: 1.88 K/UL (ref 1.1–3.7)
ABSOLUTE MONO #: 0.41 K/UL (ref 0.1–1.2)
ALBUMIN SERPL-MCNC: 4.4 G/DL (ref 3.5–5.2)
ALBUMIN/GLOBULIN RATIO: 1.4 (ref 1–2.5)
ALP BLD-CCNC: 67 U/L (ref 35–104)
ALT SERPL-CCNC: 17 U/L (ref 5–33)
ANION GAP SERPL CALCULATED.3IONS-SCNC: 15 MMOL/L (ref 9–17)
AST SERPL-CCNC: 18 U/L
BASOPHILS # BLD: 1 % (ref 0–2)
BASOPHILS ABSOLUTE: 0.04 K/UL (ref 0–0.2)
BILIRUB SERPL-MCNC: 0.46 MG/DL (ref 0.3–1.2)
BUN BLDV-MCNC: 8 MG/DL (ref 6–20)
BUN/CREAT BLD: ABNORMAL (ref 9–20)
CALCIUM SERPL-MCNC: 9.2 MG/DL (ref 8.6–10.4)
CHLORIDE BLD-SCNC: 100 MMOL/L (ref 98–107)
CO2: 24 MMOL/L (ref 20–31)
CREAT SERPL-MCNC: 0.46 MG/DL (ref 0.5–0.9)
DIFFERENTIAL TYPE: NORMAL
EOSINOPHILS RELATIVE PERCENT: 2 % (ref 1–4)
GFR AFRICAN AMERICAN: >60 ML/MIN
GFR NON-AFRICAN AMERICAN: >60 ML/MIN
GFR SERPL CREATININE-BSD FRML MDRD: ABNORMAL ML/MIN/{1.73_M2}
GFR SERPL CREATININE-BSD FRML MDRD: ABNORMAL ML/MIN/{1.73_M2}
GLUCOSE BLD-MCNC: 77 MG/DL (ref 70–99)
HCG(URINE) PREGNANCY TEST: NEGATIVE
HCT VFR BLD CALC: 45 % (ref 36.3–47.1)
HEMOGLOBIN: 14.8 G/DL (ref 11.9–15.1)
IMMATURE GRANULOCYTES: 0 %
LYMPHOCYTES # BLD: 37 % (ref 24–43)
MCH RBC QN AUTO: 30.5 PG (ref 25.2–33.5)
MCHC RBC AUTO-ENTMCNC: 32.9 G/DL (ref 28.4–34.8)
MCV RBC AUTO: 92.8 FL (ref 82.6–102.9)
MONOCYTES # BLD: 8 % (ref 3–12)
NRBC AUTOMATED: 0 PER 100 WBC
PDW BLD-RTO: 12.6 % (ref 11.8–14.4)
PLATELET # BLD: NORMAL K/UL (ref 138–453)
PLATELET ESTIMATE: NORMAL
PLATELET, FLUORESCENCE: 227 K/UL (ref 138–453)
PLATELET, IMMATURE FRACTION: 2.2 % (ref 1.1–10.3)
PMV BLD AUTO: NORMAL FL (ref 8.1–13.5)
POTASSIUM SERPL-SCNC: 4.1 MMOL/L (ref 3.7–5.3)
RBC # BLD: 4.85 M/UL (ref 3.95–5.11)
RBC # BLD: NORMAL 10*6/UL
SEG NEUTROPHILS: 52 % (ref 36–65)
SEGMENTED NEUTROPHILS ABSOLUTE COUNT: 2.66 K/UL (ref 1.5–8.1)
SEX HORMONE BINDING GLOBULIN: 83 NMOL/L (ref 30–135)
SODIUM BLD-SCNC: 139 MMOL/L (ref 135–144)
TESTOSTERONE FREE-NONMALE: 5 PG/ML (ref 0.8–7.4)
TESTOSTERONE TOTAL: 52 NG/DL (ref 20–70)
TOTAL PROTEIN: 7.5 G/DL (ref 6.4–8.3)
TSH SERPL DL<=0.05 MIU/L-ACNC: 2.08 MIU/L (ref 0.3–5)
WBC # BLD: 5.1 K/UL (ref 3.5–11.3)
WBC # BLD: NORMAL 10*3/UL

## 2019-07-09 PROCEDURE — 99213 OFFICE O/P EST LOW 20 MIN: CPT | Performed by: DERMATOLOGY

## 2019-07-09 PROCEDURE — G8417 CALC BMI ABV UP PARAM F/U: HCPCS | Performed by: DERMATOLOGY

## 2019-07-09 PROCEDURE — 1036F TOBACCO NON-USER: CPT | Performed by: DERMATOLOGY

## 2019-07-09 PROCEDURE — G8427 DOCREV CUR MEDS BY ELIG CLIN: HCPCS | Performed by: DERMATOLOGY

## 2019-07-09 RX ORDER — CETIRIZINE HYDROCHLORIDE 10 MG/1
10 TABLET ORAL 2 TIMES DAILY
Qty: 60 TABLET | Refills: 2 | Status: SHIPPED | OUTPATIENT
Start: 2019-07-09 | End: 2021-07-15

## 2019-07-09 NOTE — PROGRESS NOTES
DHEA-Sulfate    Testosterone, Free    MARTHA Screen with Reflex    CBC Auto Differential    Comprehensive Metabolic Panel    Electrophoresis Protein, Serum without Reflex to Immunofixation    TSH with Reflex    Delonte Paz MD, Allergy & Immunology, [place]    cetirizine (ZYRTEC) 10 MG tablet   3. Dyshidrotic eczema  triamcinolone (KENALOG) 0.1 % ointment       Plan of Action is as Follows:  Assessment   1. Cherry angioma, potentially eruptive  - patient is adamant that her many cherry angiomas erupted overnight, had never been there before. Also has spider angioma of right nose  - angiomas may be hormonally influenced, will get labs and pregnant test, especially considering flare of urticaria. Also SPEP to eval for POEMs syndrome  - Pregnancy, Urine; Future  - DHEA-Sulfate; Future  - Testosterone, Free; Future  - Electrophoresis Protein, Serum without Reflex to Immunofixation; Future    2. Urticaria, acute, with previously episodes in lifetime  - start antihistamine regimen, triamcinolone, and get lab work-up  - referral to allergy  - Pregnancy, Urine; Future  - DHEA-Sulfate; Future  - Testosterone, Free; Future  - MARTHA Screen with Reflex; Future  - CBC Auto Differential; Future  - Comprehensive Metabolic Panel; Future  - Electrophoresis Protein, Serum without Reflex to Immunofixation; Future  - TSH with Reflex; Future  - Seda Marquez MD, Allergy & Immunology, [place]  - cetirizine (ZYRTEC) 10 MG tablet; Take 1 tablet by mouth 2 times daily  Dispense: 60 tablet; Refill: 2  - triamcinolone (KENALOG) 0.1 % ointment; Apply to rash twice daily (not face, armpit or groin)  Dispense: 80 g; Refill: 2    RTC to discuss facial acne            Patient Instructions   1)  Take Zyrtec 10 mg twice a day. One in the morning and one at night. You may take 2 in the morning and 2 at night if needed. 2)  Apply triamcinolone 0.1%  twice a day as needed.   3)  Referral to Allergy  4)  Complete Lab work  5)  Ray Matte' PM at

## 2019-07-10 LAB
ALBUMIN (CALCULATED): 4.5 G/DL (ref 3.2–5.2)
ALBUMIN PERCENT: 64 % (ref 45–65)
ALPHA 1 PERCENT: 2 % (ref 3–6)
ALPHA 2 PERCENT: 10 % (ref 6–13)
ALPHA-1-GLOBULIN: 0.2 G/DL (ref 0.1–0.4)
ALPHA-2-GLOBULIN: 0.7 G/DL (ref 0.5–0.9)
ANTI-NUCLEAR ANTIBODY (ANA): NEGATIVE
BETA GLOBULIN: 0.7 G/DL (ref 0.5–1.1)
BETA PERCENT: 9 % (ref 11–19)
GAMMA GLOBULIN %: 15 % (ref 9–20)
GAMMA GLOBULIN: 1.1 G/DL (ref 0.5–1.5)
PATHOLOGIST: ABNORMAL
PROTEIN ELECTROPHORESIS, SERUM: ABNORMAL
TOTAL PROT. SUM,%: 100 % (ref 98–102)
TOTAL PROT. SUM: 7.2 G/DL (ref 6.3–8.2)
TOTAL PROTEIN: 7.1 G/DL (ref 6.4–8.3)

## 2019-07-29 ENCOUNTER — HOSPITAL ENCOUNTER (OUTPATIENT)
Age: 29
Setting detail: OUTPATIENT SURGERY
Discharge: HOME | End: 2019-07-29
Attending: ANESTHESIOLOGY | Admitting: ANESTHESIOLOGY
Payer: COMMERCIAL

## 2019-07-29 ENCOUNTER — APPOINTMENT (OUTPATIENT)
Dept: GENERAL RADIOLOGY | Age: 29
End: 2019-07-29
Attending: ANESTHESIOLOGY
Payer: COMMERCIAL

## 2019-07-29 VITALS
TEMPERATURE: 96.8 F | WEIGHT: 159 LBS | OXYGEN SATURATION: 100 % | DIASTOLIC BLOOD PRESSURE: 66 MMHG | HEART RATE: 77 BPM | BODY MASS INDEX: 30.02 KG/M2 | SYSTOLIC BLOOD PRESSURE: 106 MMHG | HEIGHT: 61 IN | RESPIRATION RATE: 18 BRPM

## 2019-07-29 LAB
GLUCOSE BLD-MCNC: 85 MG/DL (ref 65–105)
HCG, PREGNANCY URINE (POC): NEGATIVE

## 2019-07-29 PROCEDURE — 6360000004 HC RX CONTRAST MEDICATION: Performed by: ANESTHESIOLOGY

## 2019-07-29 PROCEDURE — 6360000002 HC RX W HCPCS: Performed by: ANESTHESIOLOGY

## 2019-07-29 PROCEDURE — 2580000003 HC RX 258: Performed by: ANESTHESIOLOGY

## 2019-07-29 PROCEDURE — 64484 NJX AA&/STRD TFRM EPI L/S EA: CPT | Performed by: ANESTHESIOLOGY

## 2019-07-29 PROCEDURE — 2709999900 HC NON-CHARGEABLE SUPPLY: Performed by: ANESTHESIOLOGY

## 2019-07-29 PROCEDURE — 3600000050 HC PAIN LEVEL 1 BASE: Performed by: ANESTHESIOLOGY

## 2019-07-29 PROCEDURE — 81025 URINE PREGNANCY TEST: CPT

## 2019-07-29 PROCEDURE — 3209999900 FLUORO FOR SURGICAL PROCEDURES

## 2019-07-29 PROCEDURE — 99152 MOD SED SAME PHYS/QHP 5/>YRS: CPT | Performed by: ANESTHESIOLOGY

## 2019-07-29 PROCEDURE — 82947 ASSAY GLUCOSE BLOOD QUANT: CPT

## 2019-07-29 PROCEDURE — 64483 NJX AA&/STRD TFRM EPI L/S 1: CPT | Performed by: ANESTHESIOLOGY

## 2019-07-29 PROCEDURE — 7100000010 HC PHASE II RECOVERY - FIRST 15 MIN: Performed by: ANESTHESIOLOGY

## 2019-07-29 PROCEDURE — 2500000003 HC RX 250 WO HCPCS: Performed by: ANESTHESIOLOGY

## 2019-07-29 PROCEDURE — 6360000002 HC RX W HCPCS

## 2019-07-29 PROCEDURE — 7100000011 HC PHASE II RECOVERY - ADDTL 15 MIN: Performed by: ANESTHESIOLOGY

## 2019-07-29 RX ORDER — FENTANYL CITRATE 50 UG/ML
INJECTION, SOLUTION INTRAMUSCULAR; INTRAVENOUS PRN
Status: DISCONTINUED | OUTPATIENT
Start: 2019-07-29 | End: 2019-07-29 | Stop reason: ALTCHOICE

## 2019-07-29 RX ORDER — DEXAMETHASONE SODIUM PHOSPHATE 10 MG/ML
INJECTION INTRAMUSCULAR; INTRAVENOUS PRN
Status: DISCONTINUED | OUTPATIENT
Start: 2019-07-29 | End: 2019-07-29 | Stop reason: ALTCHOICE

## 2019-07-29 RX ORDER — ONDANSETRON 2 MG/ML
INJECTION INTRAMUSCULAR; INTRAVENOUS
Status: COMPLETED
Start: 2019-07-29 | End: 2019-07-29

## 2019-07-29 RX ORDER — MIDAZOLAM HYDROCHLORIDE 1 MG/ML
INJECTION INTRAMUSCULAR; INTRAVENOUS PRN
Status: DISCONTINUED | OUTPATIENT
Start: 2019-07-29 | End: 2019-07-29 | Stop reason: ALTCHOICE

## 2019-07-29 RX ORDER — LIDOCAINE HYDROCHLORIDE 10 MG/ML
INJECTION, SOLUTION INFILTRATION; PERINEURAL PRN
Status: DISCONTINUED | OUTPATIENT
Start: 2019-07-29 | End: 2019-07-29 | Stop reason: ALTCHOICE

## 2019-07-29 RX ORDER — SODIUM CHLORIDE, SODIUM LACTATE, POTASSIUM CHLORIDE, CALCIUM CHLORIDE 600; 310; 30; 20 MG/100ML; MG/100ML; MG/100ML; MG/100ML
INJECTION, SOLUTION INTRAVENOUS CONTINUOUS
Status: DISCONTINUED | OUTPATIENT
Start: 2019-07-29 | End: 2019-07-29 | Stop reason: HOSPADM

## 2019-07-29 RX ORDER — SODIUM CHLORIDE 0.9 % (FLUSH) 0.9 %
10 SYRINGE (ML) INJECTION 2 TIMES DAILY
Status: DISCONTINUED | OUTPATIENT
Start: 2019-07-29 | End: 2019-07-29 | Stop reason: HOSPADM

## 2019-07-29 RX ORDER — ONDANSETRON 2 MG/ML
4 INJECTION INTRAMUSCULAR; INTRAVENOUS ONCE
Status: COMPLETED | OUTPATIENT
Start: 2019-07-29 | End: 2019-07-29

## 2019-07-29 RX ADMIN — Medication 10 ML: at 13:51

## 2019-07-29 RX ADMIN — ONDANSETRON HYDROCHLORIDE 4 MG: 2 INJECTION, SOLUTION INTRAVENOUS at 16:28

## 2019-07-29 RX ADMIN — SODIUM CHLORIDE, POTASSIUM CHLORIDE, SODIUM LACTATE AND CALCIUM CHLORIDE: 600; 310; 30; 20 INJECTION, SOLUTION INTRAVENOUS at 16:28

## 2019-07-29 RX ADMIN — ONDANSETRON 4 MG: 2 INJECTION INTRAMUSCULAR; INTRAVENOUS at 16:28

## 2019-07-29 ASSESSMENT — PAIN SCALES - GENERAL
PAINLEVEL_OUTOF10: 3
PAINLEVEL_OUTOF10: 0
PAINLEVEL_OUTOF10: 5
PAINLEVEL_OUTOF10: 0

## 2019-07-29 ASSESSMENT — PAIN - FUNCTIONAL ASSESSMENT: PAIN_FUNCTIONAL_ASSESSMENT: 0-10

## 2019-07-29 NOTE — H&P
Bandages & Supports (LUMBAR BACK BRACE/SUPPORT PAD) MISC 1 Units by Does not apply route daily PNEUMATIC OFFLOADING LUMBAR BRACE 11/2/18 11/2/19  Ang Vides MD   Elastic Bandages & Supports (LUMBAR BACK BRACE/SUPPORT PAD) MISC 1 each by Does not apply route daily as needed (back pain) 10/22/18 5/2/19  Alison Mckeon DO       This is a 29 y.o. female who is pleasant, cooperative, alert and oriented x3, in no acute distress. Heart: Heart sounds are normal.  HR regular rate and rhythm without murmur, gallop or rub. Lungs: Normal respiratory effort with good air exchange and clear to auscultation without wheezes or rales bilaterally   Abdomen: soft, nontender, nondistended with bowel sounds . Labs:  Recent Labs     07/09/19  1600   HGB 14.8   HCT 45.0   WBC 5.1   MCV 92.8   PLT See Reflexed IPF Result      K 4.1      CO2 24   BUN 8   CREATININE 0.46*   GLUCOSE 77   AST 18   ALT 17   LABALBU 4.4       ALYSA GONZALES, ANP-BC  Electronically signed 7/29/2019 at 1:43 PM                  CHRISTIAN Ramírez - CNP   Advanced Practice Nurse   Pain Management   Progress Notes   Signed   Encounter Date:  7/8/2019          Related encounter: Office Visit from 7/8/2019 in [facility] [place]         Signed            Show:Clear all  [x]Manual[x]Template[x]Copied    Added by:  [x]Sydney Lindsey MA[x]CHRISTIAN Lowry - CNP    []Jeny for details  bSubjective:      Patient ID: Elspeth Eisenmenger is a 29 y.o. female. Back Pain   This is a chronic problem. The current episode started more than 1 year ago. The problem occurs constantly. The problem has been gradually worsening since onset. The pain is present in the gluteal. The pain radiates to the right knee (numbness to right calf and right foot). The pain is at a severity of 5/10. The pain is moderate. The pain is worse during the day. The symptoms are aggravated by sitting, standing, bending and twisting.  Associated symptoms include

## 2019-10-07 ENCOUNTER — OFFICE VISIT (OUTPATIENT)
Dept: PAIN MANAGEMENT | Age: 29
End: 2019-10-07
Payer: COMMERCIAL

## 2019-10-07 VITALS
SYSTOLIC BLOOD PRESSURE: 116 MMHG | DIASTOLIC BLOOD PRESSURE: 82 MMHG | WEIGHT: 146 LBS | HEIGHT: 62 IN | HEART RATE: 94 BPM | BODY MASS INDEX: 26.87 KG/M2

## 2019-10-07 DIAGNOSIS — M51.36 BULGE OF LUMBAR DISC WITHOUT MYELOPATHY: ICD-10-CM

## 2019-10-07 DIAGNOSIS — M54.41 CHRONIC BILATERAL LOW BACK PAIN WITH RIGHT-SIDED SCIATICA: Chronic | ICD-10-CM

## 2019-10-07 DIAGNOSIS — G89.29 CHRONIC BILATERAL LOW BACK PAIN WITH RIGHT-SIDED SCIATICA: Chronic | ICD-10-CM

## 2019-10-07 PROCEDURE — 99213 OFFICE O/P EST LOW 20 MIN: CPT | Performed by: NURSE PRACTITIONER

## 2019-10-07 RX ORDER — GABAPENTIN 400 MG/1
400 CAPSULE ORAL 3 TIMES DAILY
Qty: 90 CAPSULE | Refills: 2 | Status: SHIPPED | OUTPATIENT
Start: 2019-10-07 | End: 2020-01-17 | Stop reason: SDUPTHER

## 2019-10-07 RX ORDER — BACLOFEN 10 MG/1
10 TABLET ORAL 2 TIMES DAILY PRN
Qty: 60 TABLET | Refills: 2 | Status: SHIPPED | OUTPATIENT
Start: 2019-10-07 | End: 2020-03-24 | Stop reason: SDUPTHER

## 2019-10-07 RX ORDER — BACLOFEN 10 MG/1
10 TABLET ORAL
Refills: 2 | COMMUNITY
Start: 2019-09-10 | End: 2019-10-07 | Stop reason: SDUPTHER

## 2019-10-07 RX ORDER — DULOXETIN HYDROCHLORIDE 20 MG/1
20 CAPSULE, DELAYED RELEASE ORAL DAILY
Qty: 30 CAPSULE | Refills: 2 | Status: SHIPPED | OUTPATIENT
Start: 2019-10-07 | End: 2020-01-12 | Stop reason: SDUPTHER

## 2019-10-07 ASSESSMENT — ENCOUNTER SYMPTOMS
CONSTIPATION: 0
BACK PAIN: 1
SHORTNESS OF BREATH: 0
COUGH: 0

## 2020-01-12 ENCOUNTER — PATIENT MESSAGE (OUTPATIENT)
Dept: FAMILY MEDICINE CLINIC | Age: 30
End: 2020-01-12

## 2020-01-12 RX ORDER — DULOXETIN HYDROCHLORIDE 20 MG/1
20 CAPSULE, DELAYED RELEASE ORAL DAILY
Qty: 30 CAPSULE | Refills: 2 | Status: SHIPPED | OUTPATIENT
Start: 2020-01-12 | End: 2020-04-21 | Stop reason: SDUPTHER

## 2020-01-17 ENCOUNTER — OFFICE VISIT (OUTPATIENT)
Dept: PAIN MANAGEMENT | Age: 30
End: 2020-01-17
Payer: COMMERCIAL

## 2020-01-17 VITALS
OXYGEN SATURATION: 98 % | HEART RATE: 75 BPM | BODY MASS INDEX: 24.66 KG/M2 | DIASTOLIC BLOOD PRESSURE: 68 MMHG | WEIGHT: 134 LBS | SYSTOLIC BLOOD PRESSURE: 109 MMHG | HEIGHT: 62 IN

## 2020-01-17 PROCEDURE — 99214 OFFICE O/P EST MOD 30 MIN: CPT | Performed by: NURSE PRACTITIONER

## 2020-01-17 RX ORDER — GABAPENTIN 400 MG/1
400 CAPSULE ORAL 3 TIMES DAILY
Qty: 90 CAPSULE | Refills: 2 | Status: SHIPPED | OUTPATIENT
Start: 2020-02-10 | End: 2020-05-08 | Stop reason: SDUPTHER

## 2020-01-17 RX ORDER — BACLOFEN 10 MG/1
TABLET ORAL
COMMUNITY
Start: 2020-01-11 | End: 2020-05-08 | Stop reason: SDUPTHER

## 2020-01-17 ASSESSMENT — ENCOUNTER SYMPTOMS
BACK PAIN: 1
COUGH: 1
CONSTIPATION: 0
SHORTNESS OF BREATH: 0

## 2020-01-17 NOTE — PROGRESS NOTES
Contract: none  Last Urine Dug screen dated:none    Past Medical History, Past Surgical History, Social History, Allergies and Medications reviewed and updated in EPIC as indicated    Family History reviewed and is noncontributory. Periodic Controlled Substance Monitoring: Possible medication side effects, risk of tolerance/dependence & alternative treatments discussed., No signs of potential drug abuse or diversion identified. , Assessed functional status., Obtaining appropriate analgesic effect of treatment. Pink Ovens, APRN - CNP)      Past Medical History:   Diagnosis Date    IBS (irritable bowel syndrome)     Osteoarthritis        Past Surgical History:   Procedure Laterality Date    ANESTHESIA NERVE BLOCK Right 7/29/2019    TRANSFORAMINAL LUMBAR EPIDURAL STEROID INJECTION AT L4 L5 RIGHT performed by Tracey Montoya MD at [address] [place] [address]  01/23/2019    lumbar epidural right, decadron 10    NERVE BLOCK Right 02/06/2019    right lumbar epidural- decadron 10 mg    OTHER SURGICAL HISTORY Right 07/29/2019    TRANSFORAMINAL LUMBAR EPIDURAL STEROID INJECTION AT L4 L5 RIGHT (Right )    WISDOM TOOTH EXTRACTION Bilateral 06/2018       Allergies   Allergen Reactions    Latex Hives         Current Outpatient Medications:     baclofen (LIORESAL) 10 MG tablet, , Disp: , Rfl:     DULoxetine (CYMBALTA) 20 MG extended release capsule, Take 1 capsule by mouth daily, Disp: 30 capsule, Rfl: 2    gabapentin (NEURONTIN) 400 MG capsule, Take 1 capsule by mouth 3 times daily for 30 days. , Disp: 90 capsule, Rfl: 2    cetirizine (ZYRTEC) 10 MG tablet, Take 1 tablet by mouth 2 times daily, Disp: 60 tablet, Rfl: 2    triamcinolone (KENALOG) 0.1 % ointment, Apply to rash twice daily (not face, armpit or groin), Disp: 80 g, Rfl: 2    naproxen (NAPROSYN) 500 MG tablet, Take 1 tablet by mouth 2 times daily (with meals), Disp: 60 tablet, Rfl: 3    linaclotide (LINZESS) 145 MCG capsule, Take 1 capsule gabapentin cymbalta and baclofen  Bilat TFLESI L5 S1 ordered for c/o worsening pain  Follow up appointment made for 3 months

## 2020-01-27 ENCOUNTER — PATIENT MESSAGE (OUTPATIENT)
Dept: PAIN MANAGEMENT | Age: 30
End: 2020-01-27

## 2020-01-30 NOTE — TELEPHONE ENCOUNTER
Not likely related to these issues    If it happens frequently then should get evaluation either at pcp OFFICE or ER

## 2020-02-17 ENCOUNTER — APPOINTMENT (OUTPATIENT)
Dept: GENERAL RADIOLOGY | Age: 30
End: 2020-02-17
Attending: ANESTHESIOLOGY
Payer: COMMERCIAL

## 2020-02-17 ENCOUNTER — HOSPITAL ENCOUNTER (OUTPATIENT)
Age: 30
Setting detail: OUTPATIENT SURGERY
Discharge: HOME | End: 2020-02-17
Attending: ANESTHESIOLOGY | Admitting: ANESTHESIOLOGY
Payer: COMMERCIAL

## 2020-02-17 VITALS
HEIGHT: 62 IN | BODY MASS INDEX: 25.03 KG/M2 | WEIGHT: 136 LBS | RESPIRATION RATE: 16 BRPM | OXYGEN SATURATION: 100 % | HEART RATE: 70 BPM | DIASTOLIC BLOOD PRESSURE: 59 MMHG | SYSTOLIC BLOOD PRESSURE: 101 MMHG | TEMPERATURE: 96.8 F

## 2020-02-17 LAB — HCG, PREGNANCY URINE (POC): NEGATIVE

## 2020-02-17 PROCEDURE — 99152 MOD SED SAME PHYS/QHP 5/>YRS: CPT | Performed by: ANESTHESIOLOGY

## 2020-02-17 PROCEDURE — 2709999900 HC NON-CHARGEABLE SUPPLY: Performed by: ANESTHESIOLOGY

## 2020-02-17 PROCEDURE — 64484 NJX AA&/STRD TFRM EPI L/S EA: CPT | Performed by: ANESTHESIOLOGY

## 2020-02-17 PROCEDURE — 6360000004 HC RX CONTRAST MEDICATION: Performed by: ANESTHESIOLOGY

## 2020-02-17 PROCEDURE — 7100000011 HC PHASE II RECOVERY - ADDTL 15 MIN: Performed by: ANESTHESIOLOGY

## 2020-02-17 PROCEDURE — 64483 NJX AA&/STRD TFRM EPI L/S 1: CPT | Performed by: ANESTHESIOLOGY

## 2020-02-17 PROCEDURE — 2500000003 HC RX 250 WO HCPCS: Performed by: ANESTHESIOLOGY

## 2020-02-17 PROCEDURE — 3209999900 FLUORO FOR SURGICAL PROCEDURES

## 2020-02-17 PROCEDURE — 81025 URINE PREGNANCY TEST: CPT

## 2020-02-17 PROCEDURE — 6360000002 HC RX W HCPCS: Performed by: ANESTHESIOLOGY

## 2020-02-17 PROCEDURE — 2580000003 HC RX 258: Performed by: ANESTHESIOLOGY

## 2020-02-17 PROCEDURE — 3600000050 HC PAIN LEVEL 1 BASE: Performed by: ANESTHESIOLOGY

## 2020-02-17 PROCEDURE — 7100000010 HC PHASE II RECOVERY - FIRST 15 MIN: Performed by: ANESTHESIOLOGY

## 2020-02-17 RX ORDER — LIDOCAINE HYDROCHLORIDE 10 MG/ML
INJECTION, SOLUTION INFILTRATION; PERINEURAL PRN
Status: DISCONTINUED | OUTPATIENT
Start: 2020-02-17 | End: 2020-02-17 | Stop reason: ALTCHOICE

## 2020-02-17 RX ORDER — SODIUM CHLORIDE 0.9 % (FLUSH) 0.9 %
10 SYRINGE (ML) INJECTION PRN
Status: DISCONTINUED | OUTPATIENT
Start: 2020-02-17 | End: 2020-02-17 | Stop reason: HOSPADM

## 2020-02-17 RX ORDER — DEXAMETHASONE SODIUM PHOSPHATE 10 MG/ML
INJECTION INTRAMUSCULAR; INTRAVENOUS PRN
Status: DISCONTINUED | OUTPATIENT
Start: 2020-02-17 | End: 2020-02-17 | Stop reason: ALTCHOICE

## 2020-02-17 RX ORDER — MIDAZOLAM HYDROCHLORIDE 1 MG/ML
INJECTION INTRAMUSCULAR; INTRAVENOUS PRN
Status: DISCONTINUED | OUTPATIENT
Start: 2020-02-17 | End: 2020-02-17 | Stop reason: ALTCHOICE

## 2020-02-17 RX ORDER — FENTANYL CITRATE 50 UG/ML
INJECTION, SOLUTION INTRAMUSCULAR; INTRAVENOUS PRN
Status: DISCONTINUED | OUTPATIENT
Start: 2020-02-17 | End: 2020-02-17 | Stop reason: ALTCHOICE

## 2020-02-17 RX ORDER — SODIUM CHLORIDE 0.9 % (FLUSH) 0.9 %
10 SYRINGE (ML) INJECTION EVERY 12 HOURS SCHEDULED
Status: DISCONTINUED | OUTPATIENT
Start: 2020-02-17 | End: 2020-02-17 | Stop reason: HOSPADM

## 2020-02-17 RX ADMIN — Medication 10 ML: at 14:06

## 2020-02-17 ASSESSMENT — PAIN SCALES - GENERAL
PAINLEVEL_OUTOF10: 3
PAINLEVEL_OUTOF10: 6
PAINLEVEL_OUTOF10: 3
PAINLEVEL_OUTOF10: 6

## 2020-02-17 ASSESSMENT — PAIN DESCRIPTION - DESCRIPTORS
DESCRIPTORS: ACHING;CONSTANT;DISCOMFORT;DULL
DESCRIPTORS: CONSTANT

## 2020-02-17 ASSESSMENT — PAIN DESCRIPTION - LOCATION: LOCATION: BACK

## 2020-02-17 ASSESSMENT — PAIN DESCRIPTION - ORIENTATION: ORIENTATION: LOWER

## 2020-02-17 ASSESSMENT — PAIN - FUNCTIONAL ASSESSMENT: PAIN_FUNCTIONAL_ASSESSMENT: 0-10

## 2020-02-17 NOTE — H&P
History and Physical Service   [facility]    HISTORY AND PHYSICAL EXAMINATION            Date of Evaluation: 2/17/2020  Patient name:  West Pat  MRN:   [id number]  YOB: 1990  PCP:    Mike Humphrey DO    History Obtained From:     Patient, medical records    History of Present Illness: This is West Pat a 34 y.o. female who presents today for a BILATERAL L 5 S 1 EPIDURAL STEROID INJECTION  by Dr. Cristiana Samson  for CHRONIC BILATERAL LOW BACK PAIN. SHE HAD PREVIOUS MK IN January OF 2020. [facility] THIS HAS HELPED  BUT IS WEARING OFF. SHE PRESENTS FOR EPIDURAL STEROID INJECTION. 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 Past Medical History:     Past Medical History:   Diagnosis Date    IBS (irritable bowel syndrome)     Osteoarthritis         Past Surgical History:     Past Surgical History:   Procedure Laterality Date    ANESTHESIA NERVE BLOCK Right 7/29/2019    TRANSFORAMINAL LUMBAR EPIDURAL STEROID INJECTION AT L4 L5 RIGHT performed by Tracey Montoya MD at [address] [place] [address]  01/23/2019    lumbar epidural right, decadron 10    NERVE BLOCK Right 02/06/2019    right lumbar epidural- decadron 10 mg    OTHER SURGICAL HISTORY Right 07/29/2019    TRANSFORAMINAL LUMBAR EPIDURAL STEROID INJECTION AT L4 L5 RIGHT (Right )    WISDOM TOOTH EXTRACTION Bilateral 06/2018        Medications Prior to Admission:     Prior to Admission medications    Medication Sig Start Date End Date Taking? Authorizing Provider   baclofen (LIORESAL) 10 MG tablet  1/11/20  Yes Historical Provider, MD   gabapentin (NEURONTIN) 400 MG capsule Take 1 capsule by mouth 3 times daily for 30 days.  2/10/20 3/11/20 Yes CHRISTIAN Hills - CNP   DULoxetine (CYMBALTA) 20 MG extended release capsule Take 1 capsule by mouth daily 1/12/20  Yes Mike Humphrey DO   cetirizine (ZYRTEC) 10 MG tablet Take 1 tablet by mouth 2 times daily 7/9/19  Yes Betsy SALGUERO MD [place]   triamcinolone (KENALOG) 0.1 % ointment Apply to rash twice daily (not face, armpit or groin) 7/9/19  Yes Kaley Gallegos MD   naproxen (NAPROSYN) 500 MG tablet Take 1 tablet by mouth 2 times daily (with meals) 1/7/19  Yes Lauren Tamayo DO   linaclotide (LINZESS) 145 MCG capsule Take 1 capsule by mouth every morning (before breakfast)  Patient taking differently: Take 175 mcg by mouth every morning (before breakfast) 290 mcg daily 6/26/18  Yes Diamond Hassan MD   Elastic Bandages & Supports (LUMBAR BACK BRACE/SUPPORT PAD) MISC 1 Units by Does not apply route daily PNEUMATIC OFFLOADING LUMBAR BRACE 11/2/18 11/2/19  Ryan Olivarez MD   Elastic Bandages & Supports (LUMBAR BACK BRACE/SUPPORT PAD) MISC 1 each by Does not apply route daily as needed (back pain) 10/22/18 5/2/19  Lauren Tamayo DO        Allergies:     Latex    Social History:     Tobacco:    reports that she has never smoked. She has never used smokeless tobacco.  Alcohol:      reports current alcohol use. Drug Use:  reports no history of drug use. Family History:     Family History   Problem Relation Age of Onset    Heart Defect Mother         murmur    Diabetes Father        Review of Systems:     Positive and Negative as described in HPI. CONSTITUTIONAL:  negative for fevers, chills, sweats, fatigue, weight loss  HEENT:  negative for vision, hearing changes, runny nose, throat pain  RESPIRATORY:  negative for shortness of breath, cough, congestion, wheezing. CARDIOVASCULAR:  negative for chest pain, palpitations.   GASTROINTESTINAL:  negative for nausea, vomiting, diarrhea, constipation, change in bowel habits, abdominal pain   GENITOURINARY:  negative for difficulty of urination, burning with urination, frequency   INTEGUMENT:  negative for rash, skin lesions, easy bruising   HEMATOLOGIC/LYMPHATIC:  negative for swelling/edema   ALLERGIC/IMMUNOLOGIC:  negative for urticaria , itching  ENDOCRINE:  negative increase in drinking,

## 2020-03-22 ENCOUNTER — PATIENT MESSAGE (OUTPATIENT)
Dept: PAIN MANAGEMENT | Age: 30
End: 2020-03-22

## 2020-03-23 RX ORDER — BACLOFEN 10 MG/1
TABLET ORAL
Status: CANCELLED | OUTPATIENT
Start: 2020-03-23

## 2020-03-23 NOTE — TELEPHONE ENCOUNTER
From: Mc Median  To: Carolyn Eller MD  Sent: 3/22/2020 12:04 PM EDT  Subject: Prescription Question    I need a refill of my baclofen but I can't request it on mychart.

## 2020-03-24 RX ORDER — BACLOFEN 10 MG/1
10 TABLET ORAL 2 TIMES DAILY PRN
Qty: 60 TABLET | Refills: 2 | Status: SHIPPED | OUTPATIENT
Start: 2020-03-24 | End: 2020-04-23

## 2020-04-22 RX ORDER — DULOXETIN HYDROCHLORIDE 20 MG/1
20 CAPSULE, DELAYED RELEASE ORAL DAILY
Qty: 30 CAPSULE | Refills: 2 | Status: SHIPPED | OUTPATIENT
Start: 2020-04-22 | End: 2020-05-08 | Stop reason: SDUPTHER

## 2020-05-08 ENCOUNTER — VIRTUAL VISIT (OUTPATIENT)
Dept: PAIN MANAGEMENT | Age: 30
End: 2020-05-08
Payer: COMMERCIAL

## 2020-05-08 VITALS — BODY MASS INDEX: 22.08 KG/M2 | WEIGHT: 120 LBS | HEIGHT: 62 IN

## 2020-05-08 PROCEDURE — G8427 DOCREV CUR MEDS BY ELIG CLIN: HCPCS | Performed by: ANESTHESIOLOGY

## 2020-05-08 PROCEDURE — 99213 OFFICE O/P EST LOW 20 MIN: CPT | Performed by: ANESTHESIOLOGY

## 2020-05-08 RX ORDER — GABAPENTIN 400 MG/1
400 CAPSULE ORAL 3 TIMES DAILY
Qty: 90 CAPSULE | Refills: 1 | Status: SHIPPED | OUTPATIENT
Start: 2020-05-08 | End: 2020-07-07 | Stop reason: SDUPTHER

## 2020-05-08 RX ORDER — LANOLIN ALCOHOL/MO/W.PET/CERES
5 CREAM (GRAM) TOPICAL NIGHTLY PRN
COMMUNITY

## 2020-05-08 RX ORDER — BACLOFEN 10 MG/1
10 TABLET ORAL 2 TIMES DAILY
Qty: 60 TABLET | Refills: 1 | Status: SHIPPED | OUTPATIENT
Start: 2020-05-08 | End: 2020-06-07

## 2020-05-08 RX ORDER — DULOXETIN HYDROCHLORIDE 20 MG/1
20 CAPSULE, DELAYED RELEASE ORAL DAILY
Qty: 30 CAPSULE | Refills: 1 | Status: SHIPPED | OUTPATIENT
Start: 2020-05-08 | End: 2020-12-18 | Stop reason: SDUPTHER

## 2020-05-08 ASSESSMENT — ENCOUNTER SYMPTOMS
BACK PAIN: 1
RESPIRATORY NEGATIVE: 1

## 2020-05-08 NOTE — PROGRESS NOTES
currently breastfeeding. Output: Producing urine and producing stool. HEENT: (No visible masses in neck  Range of motion appears normal on cervical spine  External ears appears normal)    Lungs:  Normal effort. She is not in respiratory distress. Neurological: Patient is alert and oriented to person, place and time.  (Able to follow command    Psych  Mood is good  Affect is normal). Skin:  No rash or cyanosis. Assessment & Plan     Lower back pain with right-sided sciatica  Failed conservative measures with physical therapy and medication management  Diagnosed with lumbar disc disease  Had lumbar epidural steroid injection with 85% relief  Currently on non-opioid medication regimen for chronic pain taking gabapentin, Cymbalta and baclofen  Denies any side effect  Finds the medication helpful  1. Chronic bilateral low back pain with right-sided sciatica    2. Bulge of lumbar disc without myelopathy        No orders of the defined types were placed in this encounter. Orders Placed This Encounter   Medications    gabapentin (NEURONTIN) 400 MG capsule     Sig: Take 1 capsule by mouth 3 times daily for 30 days. Dispense:  90 capsule     Refill:  1    baclofen (LIORESAL) 10 MG tablet     Sig: Take 1 tablet by mouth 2 times daily     Dispense:  60 tablet     Refill:  1    DULoxetine (CYMBALTA) 20 MG extended release capsule     Sig: Take 1 capsule by mouth daily     Dispense:  30 capsule     Refill:  1      Timbo Jett is a 34 y.o. female being evaluated by a Virtual Visit (video visit) encounter to address concerns as mentioned above. A caregiver was present when appropriate. Due to this being a TeleHealth encounter (During [place] [address] public health emergency), evaluation of the following organ systems was limited: Vitals/Constitutional/EENT/Resp/CV/GI//MS/Neuro/Skin/Heme-Lymph-Imm.   Pursuant to the emergency declaration under the [facility] [place], [address] waiver authority and the [employer] Act, this Virtual Visit was conducted with patient's (and/or legal guardian's) consent, to reduce the patient's risk of exposure to COVID-19 and provide necessary medical care. The patient (and/or legal guardian) has also been advised to contact this office for worsening conditions or problems, and seek emergency medical treatment and/or call 911 if deemed necessary. Patient identification was verified at the start of the visit: Yes    Total time spent for this encounter: Not billed by time    Services were provided through a video synchronous discussion virtually to substitute for in-person clinic visit. Patient and provider were located at their individual homes. --Zelma Dakins, MD on 5/8/2020 at 1:42 PM    An electronic signature was used to authenticate this note.         Electronically signed by Zelma Dakins, MD on 5/8/2020 at 1:42 PM

## 2020-07-06 ENCOUNTER — E-VISIT (OUTPATIENT)
Dept: FAMILY MEDICINE CLINIC | Age: 30
End: 2020-07-06
Payer: COMMERCIAL

## 2020-07-06 PROCEDURE — 99422 OL DIG E/M SVC 11-20 MIN: CPT | Performed by: INTERNAL MEDICINE

## 2020-07-07 ENCOUNTER — VIRTUAL VISIT (OUTPATIENT)
Dept: PAIN MANAGEMENT | Age: 30
End: 2020-07-07
Payer: COMMERCIAL

## 2020-07-07 PROCEDURE — G8427 DOCREV CUR MEDS BY ELIG CLIN: HCPCS | Performed by: ANESTHESIOLOGY

## 2020-07-07 PROCEDURE — G8420 CALC BMI NORM PARAMETERS: HCPCS | Performed by: ANESTHESIOLOGY

## 2020-07-07 PROCEDURE — 99214 OFFICE O/P EST MOD 30 MIN: CPT | Performed by: ANESTHESIOLOGY

## 2020-07-07 PROCEDURE — 1036F TOBACCO NON-USER: CPT | Performed by: ANESTHESIOLOGY

## 2020-07-07 RX ORDER — GABAPENTIN 400 MG/1
400 CAPSULE ORAL 3 TIMES DAILY
Qty: 90 CAPSULE | Refills: 1 | Status: SHIPPED | OUTPATIENT
Start: 2020-07-07 | End: 2020-12-18 | Stop reason: SDUPTHER

## 2020-07-07 ASSESSMENT — ENCOUNTER SYMPTOMS
BACK PAIN: 1
RESPIRATORY NEGATIVE: 1

## 2020-07-07 NOTE — PROGRESS NOTES
Subjective:      Patient ID: Dominic Pressley is a 34 y.o. female. HPI per pt questionare     Review of Systems see above     Objective:   Physical Exam  Unable to obtain     Assessment:    Fatigue   Leukopenia   Polydipsia  Hair loss       Plan:      Check blood work   Arelis, tsh , path smear, repeat cbc   Follow up after testing/results complete   Follow up with specialist as scheduled   Call with q/c   Spent 15 minutes on this e-visit .            Nav Garvin DO

## 2020-07-07 NOTE — PROGRESS NOTES
The patient is a 34 y. o. Non-/non  female. Chief Complaint   Patient presents with    Back Pain    Medication Refill        HPI  71-year-old woman with history of chronic back pain  Pain radiates down right leg  Pain aggravated with lifting bending twisting excessive activity  Pain interfere with quality of life  She did physical therapy without much improvement in her pain  Currently taking Cymbalta baclofen and gabapentin  Denies any side effect from the medication  Finds medication helpful allowing her to manage her pain  She had an epidural injection in February that provided her more than 50% relief lasting for more than 3 months  Pain is now gradually returning back to the baseline  Denies any loss of bladder or bowel control  No history of illicit substance use  No history of fever chills or weight loss    Medication Refill: Gabapentin    Pain score Today:  4  Adverse effects (Constipation / Nausea / Sedation / sexual Dysfunction / others) : no  Mood: good  Sleep pattern and quality: poor  Activity level: good    Pill count Today:no  Last dose taken  7/7/20OARRS report reviewed today: yes  ER/Hospitalizations/PCP visit related to pain since last visit:no   Any legal problems e.g. DUI etc.:No  Satisfied with current management: Yes    Opioid Contract:none  Last Urine Dug screen dated:none      Past Medical History, Past Surgical History, Social History, Allergies and Medications reviewed and updated in EPIC as indicated    Family History reviewed and is noncontributory.         Past Medical History:   Diagnosis Date    IBS (irritable bowel syndrome)     Osteoarthritis       Past Surgical History:   Procedure Laterality Date    ANESTHESIA NERVE BLOCK Right 7/29/2019    TRANSFORAMINAL LUMBAR EPIDURAL STEROID INJECTION AT L4 L5 RIGHT performed by Nigel Miranda MD at [address] [place] [address]  01/23/2019    lumbar epidural right, decadron 10    NERVE BLOCK Right 02/06/2019 right lumbar epidural- decadron 10 mg    NERVE BLOCK  02/17/2020    Procedures:  Epidural Steroid Injection Bilateral L5s1    OTHER SURGICAL HISTORY Right 07/29/2019    TRANSFORAMINAL LUMBAR EPIDURAL STEROID INJECTION AT L4 L5 RIGHT (Right )    PAIN MANAGEMENT PROCEDURE Bilateral 2/17/2020    EPIDURAL STEROID INJECTION BILATERAL L5S1 performed by Harley Kumar MD at [facility] Bilateral 06/2018     Social History     Socioeconomic History    Marital status: Legally      Spouse name: Not on file    Number of children: Not on file    Years of education: Not on file    Highest education level: Not on file   Occupational History    Not on file   Social Needs    Financial resource strain: Not on file    Food insecurity     Worry: Not on file     Inability: Not on file   [employer] needs     Medical: Not on file     Non-medical: Not on file   Tobacco Use    Smoking status: Never Smoker    Smokeless tobacco: Never Used   Substance and Sexual Activity    Alcohol use: Yes     Comment: socially    Drug use: No    Sexual activity: Yes   Lifestyle    Physical activity     Days per week: Not on file     Minutes per session: Not on file    Stress: Not on file   Relationships    Social connections     Talks on phone: Not on file     Gets together: Not on file     Attends L[employer]-day service: Not on file     Active member of club or organization: Not on file     Attends meetings of clubs or organizations: Not on file     Relationship status: Not on file    Intimate partner violence     Fear of current or ex partner: Not on file     Emotionally abused: Not on file     Physically abused: Not on file     Forced sexual activity: Not on file   Other Topics Concern    Not on file   Social History Narrative    Not on file     Family History   Problem Relation Age of Onset    Heart Defect Mother         murmur    Diabetes Father      Allergies   Allergen Reactions    Latex Hives     Latex   There were no vitals filed for this visit. Current Outpatient Medications   Medication Sig Dispense Refill    gabapentin (NEURONTIN) 400 MG capsule Take 1 capsule by mouth 3 times daily for 30 days. 90 capsule 1    Polyethylene Glycol 3350 (MIRALAX PO) Take by mouth daily as needed      melatonin 3 MG TABS tablet Take 5 mg by mouth nightly as needed      DULoxetine (CYMBALTA) 20 MG extended release capsule Take 1 capsule by mouth daily 30 capsule 1    cetirizine (ZYRTEC) 10 MG tablet Take 1 tablet by mouth 2 times daily 60 tablet 2    triamcinolone (KENALOG) 0.1 % ointment Apply to rash twice daily (not face, armpit or groin) 80 g 2    naproxen (NAPROSYN) 500 MG tablet Take 1 tablet by mouth 2 times daily (with meals) (Patient taking differently: Take 500 mg by mouth as needed ) 60 tablet 3    linaclotide (LINZESS) 145 MCG capsule Take 1 capsule by mouth every morning (before breakfast) (Patient taking differently: Take 175 mcg by mouth every morning (before breakfast) 290 mcg daily) 30 capsule 3    Elastic Bandages & Supports (LUMBAR BACK BRACE/SUPPORT PAD) MISC 1 Units by Does not apply route daily PNEUMATIC OFFLOADING LUMBAR BRACE 1 each 0    Elastic Bandages & Supports (LUMBAR BACK BRACE/SUPPORT PAD) MISC 1 each by Does not apply route daily as needed (back pain) 1 each 0     No current facility-administered medications for this visit. Review of Systems   Constitutional: Negative. Respiratory: Negative. Musculoskeletal: Positive for back pain. Neurological: Positive for weakness and numbness. Objective:  General Appearance:  Well-appearing and in no acute distress. Vital signs: (most recent): not currently breastfeeding. Output: Producing urine and producing stool. HEENT: (No visible masses in neck  Range of motion appears normal on cervical spine  External ears appears normal)    Lungs:  Normal effort. She is not in respiratory distress.     Neurological: Patient is alert and oriented to person, place and time.  (Able to follow command    Psych  Mood is good  Affect is normal). Skin:  No rash or cyanosis. Assessment & Plan     -Chronic low back pain with radiation down right leg  Associated lumbar disc disease  Refractory to physical therapy  Responded well to previous epidural injection  Last injection was February 2020  We will schedule for a repeat lumbar epidural injection for flareup of chronic right-sided sciatica pain    -On chronic medication management with gabapentin, Cymbalta and baclofen  Denies any side effect  Automated prescription report reviewed  Safe use of medication discussed with patient  Refill ordered    1. Medication management    2. Chronic bilateral low back pain with right-sided sciatica    3. Bulge of lumbar disc without myelopathy        Orders Placed This Encounter   Procedures    CA INJECT ANES/STEROID FORAMEN LUMBAR/SACRAL W IMG GUIDE ,1 LEVEL      Orders Placed This Encounter   Medications    gabapentin (NEURONTIN) 400 MG capsule     Sig: Take 1 capsule by mouth 3 times daily for 30 days. Dispense:  90 capsule     Refill:  1      Controlled Substance Monitoring:    Acute and Chronic Pain Monitoring:   RX Monitoring 7/7/2020   Attestation -   Periodic Controlled Substance Monitoring Possible medication side effects, risk of tolerance/dependence & alternative treatments discussed. ;No signs of potential drug abuse or diversion identified. ;Assessed functional status. Ned Bsuh is a 34 y.o. female being evaluated by a Virtual Visit (video visit) encounter to address concerns as mentioned above. A caregiver was present when appropriate. Due to this being a TeleHealth encounter (During [place]UYUG-47 public health emergency), evaluation of the following organ systems was limited: Vitals/Constitutional/EENT/Resp/CV/GI//MS/Neuro/Skin/Heme-Lymph-Imm.   Pursuant to the emergency declaration under the [address] and

## 2020-07-08 ENCOUNTER — HOSPITAL ENCOUNTER (OUTPATIENT)
Age: 30
Setting detail: SPECIMEN
Discharge: HOME | End: 2020-07-08
Payer: COMMERCIAL

## 2020-07-08 LAB
ABSOLUTE EOS #: 0.03 K/UL (ref 0–0.4)
ABSOLUTE IMMATURE GRANULOCYTE: 0 K/UL (ref 0–0.3)
ABSOLUTE LYMPH #: 1.56 K/UL (ref 1–4.8)
ABSOLUTE MONO #: 0.16 K/UL (ref 0.1–0.8)
ABSOLUTE RETIC #: 0.05 M/UL (ref 0.03–0.08)
ALBUMIN SERPL-MCNC: 4.6 G/DL (ref 3.5–5.2)
ALBUMIN/GLOBULIN RATIO: 1.8 (ref 1–2.5)
ALP BLD-CCNC: 70 U/L (ref 35–104)
ALT SERPL-CCNC: 52 U/L (ref 5–33)
ANION GAP SERPL CALCULATED.3IONS-SCNC: 10 MMOL/L (ref 9–17)
AST SERPL-CCNC: 35 U/L
BASOPHILS # BLD: 0 % (ref 0–2)
BASOPHILS ABSOLUTE: 0 K/UL (ref 0–0.2)
BILIRUB SERPL-MCNC: 0.63 MG/DL (ref 0.3–1.2)
BUN BLDV-MCNC: 6 MG/DL (ref 6–20)
BUN/CREAT BLD: ABNORMAL (ref 9–20)
CALCIUM SERPL-MCNC: 9.2 MG/DL (ref 8.6–10.4)
CHLORIDE BLD-SCNC: 101 MMOL/L (ref 98–107)
CO2: 29 MMOL/L (ref 20–31)
CREAT SERPL-MCNC: 0.41 MG/DL (ref 0.5–0.9)
DIFFERENTIAL TYPE: ABNORMAL
EOSINOPHILS RELATIVE PERCENT: 1 % (ref 1–4)
ESTIMATED AVERAGE GLUCOSE: 82 MG/DL
GFR AFRICAN AMERICAN: >60 ML/MIN
GFR NON-AFRICAN AMERICAN: >60 ML/MIN
GFR SERPL CREATININE-BSD FRML MDRD: ABNORMAL ML/MIN/{1.73_M2}
GFR SERPL CREATININE-BSD FRML MDRD: ABNORMAL ML/MIN/{1.73_M2}
GLUCOSE BLD-MCNC: 73 MG/DL (ref 70–99)
HBA1C MFR BLD: 4.5 % (ref 4–6)
HCT VFR BLD CALC: 42.9 % (ref 36.3–47.1)
HEMOGLOBIN: 14.4 G/DL (ref 11.9–15.1)
IMMATURE GRANULOCYTES: 0 %
IMMATURE RETIC FRACT: 9.3 % (ref 2.7–18.3)
LYMPHOCYTES # BLD: 58 % (ref 24–44)
MCH RBC QN AUTO: 32.1 PG (ref 25.2–33.5)
MCHC RBC AUTO-ENTMCNC: 33.6 G/DL (ref 28.4–34.8)
MCV RBC AUTO: 95.8 FL (ref 82.6–102.9)
MONOCYTES # BLD: 6 % (ref 1–7)
MORPHOLOGY: NORMAL
NRBC AUTOMATED: 0 PER 100 WBC
OSMOLALITY URINE: 136 MOSM/KG (ref 80–1300)
PDW BLD-RTO: 12.3 % (ref 11.8–14.4)
PLATELET # BLD: 159 K/UL (ref 138–453)
PLATELET ESTIMATE: ABNORMAL
PMV BLD AUTO: 12.9 FL (ref 8.1–13.5)
POTASSIUM SERPL-SCNC: 3.9 MMOL/L (ref 3.7–5.3)
RBC # BLD: 4.48 M/UL (ref 3.95–5.11)
RBC # BLD: ABNORMAL 10*6/UL
RETIC %: 1 % (ref 0.5–1.9)
RETIC HEMOGLOBIN: 38.7 PG (ref 28.2–35.7)
SEG NEUTROPHILS: 35 % (ref 36–66)
SEGMENTED NEUTROPHILS ABSOLUTE COUNT: 0.95 K/UL (ref 1.8–7.7)
SERUM OSMOLALITY: 293 MOSM/KG (ref 275–295)
SODIUM BLD-SCNC: 140 MMOL/L (ref 135–144)
THYROGLOBULIN AB: <20 IU/ML (ref 0–40)
THYROID PEROXIDASE (TPO) AB: 52.3 IU/ML (ref 0–35)
TOTAL PROTEIN: 7.2 G/DL (ref 6.4–8.3)
TSH SERPL DL<=0.05 MIU/L-ACNC: 4.03 MIU/L (ref 0.3–5)
WBC # BLD: 2.7 K/UL (ref 3.5–11.3)
WBC # BLD: ABNORMAL 10*3/UL

## 2020-07-09 LAB
ANTI-NUCLEAR ANTIBODY (ANA): ABNORMAL
PATHOLOGIST REVIEW: NORMAL
SURGICAL PATHOLOGY REPORT: NORMAL

## 2020-08-03 RX ORDER — NAPROXEN 500 MG/1
500 TABLET ORAL 2 TIMES DAILY WITH MEALS
Qty: 60 TABLET | Refills: 3 | Status: SHIPPED | OUTPATIENT
Start: 2020-08-03 | End: 2020-12-18 | Stop reason: SDUPTHER

## 2020-08-07 ENCOUNTER — HOSPITAL ENCOUNTER (OUTPATIENT)
Facility: MEDICAL CENTER | Age: 30
End: 2020-08-07
Payer: COMMERCIAL

## 2020-08-11 ENCOUNTER — TELEPHONE (OUTPATIENT)
Dept: ONCOLOGY | Age: 30
End: 2020-08-11

## 2020-08-11 ENCOUNTER — INITIAL CONSULT (OUTPATIENT)
Dept: ONCOLOGY | Age: 30
End: 2020-08-11
Payer: COMMERCIAL

## 2020-08-11 VITALS
DIASTOLIC BLOOD PRESSURE: 85 MMHG | TEMPERATURE: 98.8 F | BODY MASS INDEX: 22.5 KG/M2 | SYSTOLIC BLOOD PRESSURE: 115 MMHG | HEART RATE: 97 BPM | WEIGHT: 123 LBS

## 2020-08-11 PROCEDURE — G8427 DOCREV CUR MEDS BY ELIG CLIN: HCPCS | Performed by: INTERNAL MEDICINE

## 2020-08-11 PROCEDURE — 99245 OFF/OP CONSLTJ NEW/EST HI 55: CPT | Performed by: INTERNAL MEDICINE

## 2020-08-11 PROCEDURE — 99202 OFFICE O/P NEW SF 15 MIN: CPT | Performed by: INTERNAL MEDICINE

## 2020-08-11 PROCEDURE — G8420 CALC BMI NORM PARAMETERS: HCPCS | Performed by: INTERNAL MEDICINE

## 2020-08-11 RX ORDER — BACLOFEN 10 MG/1
10 TABLET ORAL 2 TIMES DAILY
COMMUNITY
Start: 2020-07-21 | End: 2020-12-18 | Stop reason: SDUPTHER

## 2020-08-11 NOTE — PROGRESS NOTES
_               Ms. Gloria Go is a very pleasant 34 y.o. female with history of multiple co morbidities as listed. Patient had follow-up with gastroenterology for multiple GI symptoms and was diagnosed with irritable bowel syndrome. She had recent problem with increasing abdominal discomfort and early satiety and increasing problem with weight loss. She had previous GI work-up and EGD with records not available to me. As per the patient no significant findings were seen. The patient had recent blood work which showed evidence of neutropenia. She is referred for further evaluation. She has no lymphadenopathy. Patient states that she was not feeling well at the time of that testing. She had excessive weakness and fatigue and feeling tired. No documented fever but she felt warm. She has generalized body aches. She had no appetite. She had no vomiting. No chest pain or shortness of breath. No headaches. Patient denies any history of repeated infections. No recent treatments. Patient had no history of smoking or alcohol drinking. Patient sister and father had history of hepatitis. PAST MEDICAL HISTORY: has a past medical history of IBS (irritable bowel syndrome) and Osteoarthritis. PAST SURGICAL HISTORY: has a past surgical history that includes [place] tooth extraction (Bilateral, 06/2018); Nerve Block (01/23/2019); Nerve Block (Right, 02/06/2019); Colonoscopy; other surgical history (Right, 07/29/2019); Anesthesia Nerve Block (Right, 7/29/2019); Nerve Block (02/17/2020); and Pain management procedure (Bilateral, 2/17/2020). CURRENT MEDICATIONS:  has a current medication list which includes the following prescription(s): naproxen, gabapentin, polyethylene glycol 3350, melatonin, duloxetine, cetirizine, triamcinolone, linaclotide, and baclofen. ALLERGIES:  is allergic to latex.     FAMILY HISTORY: Father and sister had history of hepatitis. Otherwise negative for any hematological or oncological conditions. SOCIAL HISTORY:  reports that she has never smoked. She has never used smokeless tobacco. She reports current alcohol use. She reports that she does not use drugs. REVIEW OF SYSTEMS:     · General: Positive for weakness and fatigue. Positive for weight loss and decreased appetite. No fever or chills. · Eyes: No blurred vision, eye pain or double vision. · Ears: No hearing problems or drainage. No tinnitus. · Throat: No sore throat, problems with swallowing or dysphagia. · Respiratory: No cough, sputum or hemoptysis. No shortness of breath. No pleuritic chest pain. · Cardiovascular: No chest pain, orthopnea or PND. No lower extremity edema. No palpitation. · Gastrointestinal: As above. · Genitourinary: No dysuria, hematuria, frequency or urgency. · Musculoskeletal: No muscle aches or pains. No limitation of movement. No back pain. No gait disturbance, No joint complaints. · Dermatologic: No skin rashes or pruritus. No skin lesions or discolorations. · Psychiatric: No depression, anxiety, or stress or signs of schizophrenia. No change in mood or affect. · Hematologic: No history of bleeding tendency. No bruises or ecchymosis. No history of clotting problems. · Infectious disease: No fever, chills or frequent infections. · Endocrine: No polydipsia or polyuria. No temperature intolerance. · Neurologic: No headaches or dizziness. No weakness or numbness of the extremities. No changes in balance, coordination,  memory, mentation, behavior. · Allergic/Immunologic: No nasal congestion or hives. No repeated infections. PHYSICAL EXAM:  The patient is not in acute distress. Vital signs: Blood pressure 115/85, pulse 97, temperature 98.8 °F (37.1 °C), temperature source Skin, weight 123 lb (55.8 kg), not currently breastfeeding.      General appearance - well appearing, language. Previous lab tests showed normal white blood cells. Recently had lab test showing neutropenia. Patient had multiple other symptoms mainly suggestive of some GI abnormalities but concerned about possible infectious cause with possible viral etiology. Patient will continue have follow-up with her gastroenterologist for further management. I explained to the patient possible underlying cause for this leukopenia. She has normal hemoglobin and platelets. Considering her age this leukopenia could be related to immune mediated etiology or recent viral infection or possible liver disease. Concerned about possible underlying hepatitis with patient's labs showing elevated transaminases and family history of hepatitis with her father and sister. I will check liver function and hepatitis panel and we will check CMV and HIV. We will check ferritin level. Most recent MARTHA test is negative. I will repeat her CBC today. I will also arrange for liver spleen ultrasound. We will make further recommendations based on the results. Patient's questions were answered to the best of her satisfaction and she verbalized full understanding and agreement.

## 2020-08-11 NOTE — LETTER
_    Brandon Hoffman MD    8/11/2020     [address]  [address]  [id number]    Dear Dr Louisa Stallings:    Thank you for referring Garrick Cobb, 1990, to me for evaluation. Below are the relevant portions of my assessment and plan of care. Ms. Garrick Cobb is a very pleasant 34 y.o. female with history of multiple co morbidities as listed. Patient had follow-up with gastroenterology for multiple GI symptoms and was diagnosed with irritable bowel syndrome. She had recent problem with increasing abdominal discomfort and early satiety and increasing problem with weight loss. She had previous GI work-up and EGD with records not available to me. As per the patient no significant findings were seen. The patient had recent blood work which showed evidence of neutropenia. She is referred for further evaluation. She has no lymphadenopathy. Patient states that she was not feeling well at the time of that testing. She had excessive weakness and fatigue and feeling tired. No documented fever but she felt warm. She has generalized body aches. She had no appetite. She had no vomiting. No chest pain or shortness of breath. No headaches. Patient denies any history of repeated infections. No recent treatments. Patient had no history of smoking or alcohol drinking. Patient sister and father had history of hepatitis. PAST MEDICAL HISTORY: has a past medical history of IBS (irritable bowel syndrome) and Osteoarthritis. PAST SURGICAL HISTORY: has a past surgical history that includes [place] tooth extraction (Bilateral, 06/2018); Nerve Block (01/23/2019); Nerve Block (Right, 02/06/2019); Colonoscopy; other surgical history (Right, 07/29/2019); Anesthesia Nerve Block (Right, 7/29/2019); Nerve Block (02/17/2020); and Pain management procedure (Bilateral, 2/17/2020).      CURRENT MEDICATIONS:  has a current medication list which includes the PHYSICAL EXAM:  The patient is not in acute distress. Vital signs: Blood pressure 115/85, pulse 97, temperature 98.8 °F (37.1 °C), temperature source Skin, weight 123 lb (55.8 kg), not currently breastfeeding. General appearance - well appearing, not in pain or distress  Mental status - good mood, alert and oriented  Eyes - pupils equal and reactive, extraocular eye movements intact  Ears - bilateral TM's and external ear canals normal  Nose - normal and patent, no erythema, discharge or polyps  Mouth - mucous membranes moist, pharynx normal without lesions  Neck - supple, no significant adenopathy  Lymphatics - no palpable lymphadenopathy, no hepatosplenomegaly  Chest - clear to auscultation, no wheezes, rales or rhonchi, symmetric air entry  Heart - normal rate, regular rhythm, normal S1, S2, no murmurs, rubs, clicks or gallops  Abdomen -mild epigastric discomfort. Soft, nondistended, no masses or organomegaly  Neurological - alert, oriented, normal speech, no focal findings or movement disorder noted  Musculoskeletal - no joint tenderness, deformity or swelling  Extremities - peripheral pulses normal, no pedal edema, no clubbing or cyanosis  Skin - normal coloration and turgor, no rashes, no suspicious skin lesions noted     Review of Diagnostic data:   Lab Results   Component Value Date    WBC 2.7 (L) 07/08/2020    HGB 14.4 07/08/2020    HCT 42.9 07/08/2020    MCV 95.8 07/08/2020     07/08/2020       Chemistry        Component Value Date/Time     07/08/2020 1119    K 3.9 07/08/2020 1119     07/08/2020 1119    CO2 29 07/08/2020 1119    BUN 6 07/08/2020 1119    CREATININE 0.41 (L) 07/08/2020 1119        Component Value Date/Time    CALCIUM 9.2 07/08/2020 1119    ALKPHOS 70 07/08/2020 1119    AST 35 (H) 07/08/2020 1119    ALT 52 (H) 07/08/2020 1119    BILITOT 0.63 07/08/2020 1119            IMPRESSION:   Leukopenia/neutropenia  Elevated transaminases  Weight loss. Early satiety. PLAN: I reviewed the labs available to me and discussed with the patient. For more than 60 minutes of face to face discussion, I explained to the patient the nature of this hematologic problem. I explained the significance of these abnormalities in layman language. Previous lab tests showed normal white blood cells. Recently had lab test showing neutropenia. Patient had multiple other symptoms mainly suggestive of some GI abnormalities but concerned about possible infectious cause with possible viral etiology. Patient will continue have follow-up with her gastroenterologist for further management. I explained to the patient possible underlying cause for this leukopenia. She has normal hemoglobin and platelets. Considering her age this leukopenia could be related to immune mediated etiology or recent viral infection or possible liver disease. Concerned about possible underlying hepatitis with patient's labs showing elevated transaminases and family history of hepatitis with her father and sister. I will check liver function and hepatitis panel and we will check CMV and HIV. We will check ferritin level. Most recent MARTHA test is negative. I will repeat her CBC today. I will also arrange for liver spleen ultrasound. We will make further recommendations based on the results. Patient's questions were answered to the best of her satisfaction and she verbalized full understanding and agreement. If you have questions, please do not hesitate to call me. I look forward to following Richard Vanessa along with you.     Sincerely,                            [facility]                          Cell: [phone]    This note is created with the assistance of a speech recognition program.  While intending to generate a document that actually reflects the content of the visit, the document can still have some errors including those of syntax and sound a like substitutions which may escape proof reading. It such instances, actual meaning can be extrapolated by contextual diversion.

## 2020-08-17 ENCOUNTER — HOSPITAL ENCOUNTER (OUTPATIENT)
Facility: MEDICAL CENTER | Age: 30
End: 2020-08-17
Payer: COMMERCIAL

## 2020-08-18 ENCOUNTER — HOSPITAL ENCOUNTER (OUTPATIENT)
Facility: MEDICAL CENTER | Age: 30
Discharge: HOME | End: 2020-08-18
Payer: COMMERCIAL

## 2020-08-18 ENCOUNTER — HOSPITAL ENCOUNTER (OUTPATIENT)
Dept: ULTRASOUND IMAGING | Age: 30
Discharge: HOME | End: 2020-08-20
Payer: COMMERCIAL

## 2020-08-18 DIAGNOSIS — D70.9 NEUTROPENIA, UNSPECIFIED TYPE (HCC): ICD-10-CM

## 2020-08-18 DIAGNOSIS — R74.01 ELEVATED TRANSAMINASE LEVEL: ICD-10-CM

## 2020-08-18 LAB
ABSOLUTE EOS #: 0.06 K/UL (ref 0–0.44)
ABSOLUTE IMMATURE GRANULOCYTE: 0.01 K/UL (ref 0–0.3)
ABSOLUTE LYMPH #: 1.24 K/UL (ref 1.1–3.7)
ABSOLUTE MONO #: 0.24 K/UL (ref 0.1–1.2)
ALBUMIN SERPL-MCNC: 4.3 G/DL (ref 3.5–5.2)
ALBUMIN/GLOBULIN RATIO: ABNORMAL (ref 1–2.5)
ALP BLD-CCNC: 63 U/L (ref 35–104)
ALT SERPL-CCNC: 44 U/L (ref 5–33)
AST SERPL-CCNC: 30 U/L
BASOPHILS # BLD: 1 % (ref 0–2)
BASOPHILS ABSOLUTE: 0.03 K/UL (ref 0–0.2)
BILIRUB SERPL-MCNC: 0.75 MG/DL (ref 0.3–1.2)
BILIRUBIN DIRECT: 0.2 MG/DL
BILIRUBIN, INDIRECT: 0.55 MG/DL (ref 0–1)
DIFFERENTIAL TYPE: ABNORMAL
EOSINOPHILS RELATIVE PERCENT: 2 % (ref 1–4)
FERRITIN: 33 UG/L (ref 13–150)
GLOBULIN: ABNORMAL G/DL (ref 1.5–3.8)
HAV IGM SER IA-ACNC: NONREACTIVE
HCT VFR BLD CALC: 38.3 % (ref 36.3–47.1)
HEMOGLOBIN: 13.2 G/DL (ref 11.9–15.1)
HEPATITIS B CORE IGM ANTIBODY: NONREACTIVE
HEPATITIS B SURFACE ANTIGEN: NONREACTIVE
HEPATITIS C ANTIBODY: NONREACTIVE
HIV AG/AB: NONREACTIVE
IMMATURE GRANULOCYTES: 0 %
LYMPHOCYTES # BLD: 49 % (ref 24–43)
MCH RBC QN AUTO: 32.5 PG (ref 25.2–33.5)
MCHC RBC AUTO-ENTMCNC: 34.5 G/DL (ref 28.4–34.8)
MCV RBC AUTO: 94.3 FL (ref 82.6–102.9)
MONOCYTES # BLD: 9 % (ref 3–12)
NRBC AUTOMATED: 0 PER 100 WBC
PDW BLD-RTO: 12.3 % (ref 11.8–14.4)
PLATELET # BLD: 154 K/UL (ref 138–453)
PLATELET ESTIMATE: ABNORMAL
PMV BLD AUTO: 10.6 FL (ref 8.1–13.5)
RBC # BLD: 4.06 M/UL (ref 3.95–5.11)
RBC # BLD: ABNORMAL 10*6/UL
SEG NEUTROPHILS: 39 % (ref 36–65)
SEGMENTED NEUTROPHILS ABSOLUTE COUNT: 1.02 K/UL (ref 1.5–8.1)
TOTAL PROTEIN: 6.4 G/DL (ref 6.4–8.3)
WBC # BLD: 2.6 K/UL (ref 3.5–11.3)
WBC # BLD: ABNORMAL 10*3/UL

## 2020-08-18 PROCEDURE — 76705 ECHO EXAM OF ABDOMEN: CPT

## 2020-08-18 PROCEDURE — 85025 COMPLETE CBC W/AUTO DIFF WBC: CPT

## 2020-08-18 PROCEDURE — 80076 HEPATIC FUNCTION PANEL: CPT

## 2020-08-18 PROCEDURE — 36415 COLL VENOUS BLD VENIPUNCTURE: CPT

## 2020-08-18 PROCEDURE — 87389 HIV-1 AG W/HIV-1&-2 AB AG IA: CPT

## 2020-08-18 PROCEDURE — 82728 ASSAY OF FERRITIN: CPT

## 2020-08-18 PROCEDURE — 80074 ACUTE HEPATITIS PANEL: CPT

## 2020-08-20 ENCOUNTER — TELEPHONE (OUTPATIENT)
Dept: ONCOLOGY | Age: 30
End: 2020-08-20

## 2020-08-20 ENCOUNTER — VIRTUAL VISIT (OUTPATIENT)
Dept: ONCOLOGY | Age: 30
End: 2020-08-20
Payer: COMMERCIAL

## 2020-08-20 PROCEDURE — 99214 OFFICE O/P EST MOD 30 MIN: CPT | Performed by: INTERNAL MEDICINE

## 2020-08-20 PROCEDURE — G8427 DOCREV CUR MEDS BY ELIG CLIN: HCPCS | Performed by: INTERNAL MEDICINE

## 2020-08-20 NOTE — TELEPHONE ENCOUNTER
NATHALY SIGNS ON TO DOXY ME FOR VIRTUAL VISIT  DR Corrinne Ego SIGNS ON  ORDERS RECEIVED  LABS 6-8 WEEKS   VIRTUAL VISIT AFTER TESTING  LABS HEPATIC FUNCTION PANEL CDP 10/8/20  MD VISIT 10/15/20 @ 3:15 PM VIRTUAL APPOINTMENT  AVS PRINTED AND MAILED TO PATIENT

## 2020-08-20 NOTE — PROGRESS NOTES
_        2020    TELEHEALTH EVALUATION -- Audio/Visual (During HARCS-08 public health emergency)    HPI:    Andrew Lloyd (:  1990) has requested an audio/video evaluation for the following concern(s):    Follow-up visit on neutropenia and elevated transaminases    Ms. Nancy Ferrer is a very pleasant 34 y.o. female with history of multiple co morbidities as listed. Patient had follow-up with gastroenterology for multiple GI symptoms and was diagnosed with irritable bowel syndrome. She had recent problem with increasing abdominal discomfort and early satiety and increasing problem with weight loss. She had previous GI work-up and EGD with records not available to me. As per the patient no significant findings were seen. The patient had recent blood work which showed evidence of neutropenia. She is referred for further evaluation. She has no lymphadenopathy. Patient states that she was not feeling well at the time of that testing. She had excessive weakness and fatigue and feeling tired. No documented fever but she felt warm. She has generalized body aches. She had no appetite. She had no vomiting. No chest pain or shortness of breath. No headaches. Patient denies any history of repeated infections. No recent treatments. Patient had no history of smoking or alcohol drinking. Patient sister and father had history of hepatitis. INTERIM HISTORY:  The patient seen for follow-up neutropenia. Clinically stable. No fever. No infections. No enlarged lymph nodes. No episodes of use of breath. No chest pain or shortness of breath. No other complaints. Review of Systems    Prior to Visit Medications    Medication Sig Taking?  Authorizing Provider   baclofen (LIORESAL) 10 MG tablet Take 10 mg by mouth 2 times daily Yes Historical Provider, MD   naproxen (NAPROSYN) 500 MG tablet Take 1 tablet by mouth 2 times daily (with meals)  Patient taking differently: Take 500 mg by mouth 2 times daily (with meals) Taking PRN Yes Judah Gore DO   gabapentin (NEURONTIN) 400 MG capsule Take 1 capsule by mouth 3 times daily for 30 days. Yes Shayna Melo MD   Polyethylene Glycol 3350 (MIRALAX PO) Take by mouth 2 times daily  Yes Historical Provider, MD   melatonin 3 MG TABS tablet Take 5 mg by mouth nightly as needed Yes Historical Provider, MD   DULoxetine (CYMBALTA) 20 MG extended release capsule Take 1 capsule by mouth daily Yes Shayna Melo MD   cetirizine (ZYRTEC) 10 MG tablet Take 1 tablet by mouth 2 times daily  Patient taking differently: Take 10 mg by mouth 2 times daily Taking PRN Yes Josselyn Oconnor MD   triamcinolone (KENALOG) 0.1 % ointment Apply to rash twice daily (not face, armpit or groin) Yes Josselyn Oconnor MD   linaclotide (LINZESS) 145 MCG capsule Take 1 capsule by mouth every morning (before breakfast)  Patient taking differently: Take 175 mcg by mouth every morning (before breakfast) 290 mcg daily Yes Lyndal Mortimer, MD       Social History     Tobacco Use    Smoking status: Never Smoker    Smokeless tobacco: Never Used   Substance Use Topics    Alcohol use: Yes     Comment: socially    Drug use: No            PHYSICAL EXAMINATION:  [ INSTRUCTIONS:  \"[x]\" Indicates a positive item  \"[]\" Indicates a negative item  -- DELETE ALL ITEMS NOT EXAMINED]  Vital Signs: (As obtained by patient/caregiver or practitioner observation)    Blood pressure-  Heart rate-    Respiratory rate-    Temperature-  Pulse oximetry-     Constitutional: [x] Appears well-developed and well-nourished [] No apparent distress      [] Abnormal-   Mental status  [x] Alert and awake  [x] Oriented to person/place/time [x]Able to follow commands      Eyes:  EOM    [x]  Normal  [] Abnormal-  Sclera  [x]  Normal  [] Abnormal -         Discharge [x]  None visible  [] Abnormal -    HENT:   [x] Normocephalic, atraumatic.   [] Abnormal   [x] Mouth/Throat: Mucous membranes are moist.     External Ears [x] Normal  [] Abnormal-     Neck: [x] No visualized mass     Pulmonary/Chest: [x] Respiratory effort normal.  [] No visualized signs of difficulty breathing or respiratory distress        [] Abnormal-      Musculoskeletal:   [x] Normal gait with no signs of ataxia         [x] Normal range of motion of neck        [] Abnormal-       Neurological:        [x] No Facial Asymmetry (Cranial nerve 7 motor function) (limited exam to video visit)          [x] No gaze palsy        [] Abnormal-         Skin:        [x] No significant exanthematous lesions or discoloration noted on facial skin         [] Abnormal-            Psychiatric:       [x] Normal Affect [x] No Hallucinations        [] Abnormal-     Other pertinent observable physical exam findings-     ASSESSMENT/PLAN:  1. Elevated transaminase level  2. Neutropenia    Labs are reviewed and discussed with the patient. Labs showed no serious abnormalities. She continues to have slightly repeated transaminases. However ultrasound of the liver and spleen is unremarkable. No other obvious cause for the patient's neutropenia. It is likely related to liver disease. No viral etiology. At this point recommend monitoring. We will repeat labs including liver function and CBC in about 6 to 8 weeks. If we see any deterioration or neuritis can be done at that time. - CBC Auto Differential; Future  - Hepatic Function Panel; Future          Veremilianotine Makenna is a 34 y.o. female being evaluated by a Virtual Visit (video visit) encounter to address concerns as mentioned above. A caregiver was present when appropriate. Due to this being a TeleHealth encounter (During CNGOB-95 public health emergency), evaluation of the following organ systems was limited: Vitals/Constitutional/EENT/Resp/CV/GI//MS/Neuro/Skin/Heme-Lymph-Imm.   Pursuant to the emergency declaration under the [address] and the smoking or alcohol drinking. Patient sister and father had history of hepatitis. PAST MEDICAL HISTORY: has a past medical history of IBS (irritable bowel syndrome) and Osteoarthritis. PAST SURGICAL HISTORY: has a past surgical history that includes [place]l tooth extraction (Bilateral, 06/2018); Nerve Block (01/23/2019); Nerve Block (Right, 02/06/2019); Colonoscopy; other surgical history (Right, 07/29/2019); Anesthesia Nerve Block (Right, 7/29/2019); Nerve Block (02/17/2020); and Pain management procedure (Bilateral, 2/17/2020). CURRENT MEDICATIONS:  has a current medication list which includes the following prescription(s): baclofen, naproxen, gabapentin, polyethylene glycol 3350, melatonin, duloxetine, cetirizine, triamcinolone, and linaclotide. ALLERGIES:  is allergic to latex. FAMILY HISTORY: Father and sister had history of hepatitis. Otherwise negative for any hematological or oncological conditions. SOCIAL HISTORY:  reports that she has never smoked. She has never used smokeless tobacco. She reports current alcohol use. She reports that she does not use drugs. REVIEW OF SYSTEMS:     · General: Positive for weakness and fatigue. Positive for weight loss and decreased appetite. No fever or chills. · Eyes: No blurred vision, eye pain or double vision. · Ears: No hearing problems or drainage. No tinnitus. · Throat: No sore throat, problems with swallowing or dysphagia. · Respiratory: No cough, sputum or hemoptysis. No shortness of breath. No pleuritic chest pain. · Cardiovascular: No chest pain, orthopnea or PND. No lower extremity edema. No palpitation. · Gastrointestinal: As above. · Genitourinary: No dysuria, hematuria, frequency or urgency. · Musculoskeletal: No muscle aches or pains. No limitation of movement. No back pain. No gait disturbance, No joint complaints. · Dermatologic: No skin rashes or pruritus. No skin lesions or discolorations.    · Psychiatric: No depression, anxiety, or stress or signs of schizophrenia. No change in mood or affect. · Hematologic: No history of bleeding tendency. No bruises or ecchymosis. No history of clotting problems. · Infectious disease: No fever, chills or frequent infections. · Endocrine: No polydipsia or polyuria. No temperature intolerance. · Neurologic: No headaches or dizziness. No weakness or numbness of the extremities. No changes in balance, coordination,  memory, mentation, behavior. · Allergic/Immunologic: No nasal congestion or hives. No repeated infections. PHYSICAL EXAM:  The patient is not in acute distress. Vital signs: not currently breastfeeding. General appearance - well appearing, not in pain or distress  Mental status - good mood, alert and oriented  Eyes - pupils equal and reactive, extraocular eye movements intact  Ears - bilateral TM's and external ear canals normal  Nose - normal and patent, no erythema, discharge or polyps  Mouth - mucous membranes moist, pharynx normal without lesions  Neck - supple, no significant adenopathy  Lymphatics - no palpable lymphadenopathy, no hepatosplenomegaly  Chest - clear to auscultation, no wheezes, rales or rhonchi, symmetric air entry  Heart - normal rate, regular rhythm, normal S1, S2, no murmurs, rubs, clicks or gallops  Abdomen -mild epigastric discomfort.   Soft, nondistended, no masses or organomegaly  Neurological - alert, oriented, normal speech, no focal findings or movement disorder noted  Musculoskeletal - no joint tenderness, deformity or swelling  Extremities - peripheral pulses normal, no pedal edema, no clubbing or cyanosis  Skin - normal coloration and turgor, no rashes, no suspicious skin lesions noted     Review of Diagnostic data:   Lab Results   Component Value Date    WBC 2.6 (L) 08/18/2020    HGB 13.2 08/18/2020    HCT 38.3 08/18/2020    MCV 94.3 08/18/2020     08/18/2020       Chemistry        Component Value Date/Time    NA 140 07/08/2020 1119    K 3.9 07/08/2020 1119     07/08/2020 1119    CO2 29 07/08/2020 1119    BUN 6 07/08/2020 1119    CREATININE 0.41 (L) 07/08/2020 1119        Component Value Date/Time    CALCIUM 9.2 07/08/2020 1119    ALKPHOS 63 08/18/2020 0800    AST 30 08/18/2020 0800    ALT 44 (H) 08/18/2020 0800    BILITOT 0.75 08/18/2020 0800            IMPRESSION:   Leukopenia/neutropenia  Elevated transaminases  Weight loss. Early satiety. PLAN: I reviewed the labs available to me and discussed with the patient. For more than 60 minutes of face to face discussion, I explained to the patient the nature of this hematologic problem. I explained the significance of these abnormalities in layman language. Previous lab tests showed normal white blood cells. Recently had lab test showing neutropenia. Patient had multiple other symptoms mainly suggestive of some GI abnormalities but concerned about possible infectious cause with possible viral etiology. Patient will continue have follow-up with her gastroenterologist for further management. I explained to the patient possible underlying cause for this leukopenia. She has normal hemoglobin and platelets. Considering her age this leukopenia could be related to immune mediated etiology or recent viral infection or possible liver disease. Concerned about possible underlying hepatitis with patient's labs showing elevated transaminases and family history of hepatitis with her father and sister. I will check liver function and hepatitis panel and we will check CMV and HIV. We will check ferritin level. Most recent MARTHA test is negative. I will repeat her CBC today. I will also arrange for liver spleen ultrasound. We will make further recommendations based on the results. Patient's questions were answered to the best of her satisfaction and she verbalized full understanding and agreement.

## 2020-08-23 LAB
CMV DNA QUANTATATIVE INTERPRETATION: NOT DETECTED
CMV QUANT IU/ML: <227 IU/ML
CMV QUANT LOG IU/ML: <2.4 LOG IU/ML
CMV SOURCE: NORMAL
CMVQ COPY/ML: <390 CPY/ML
CYTOMEGALOVIRUS QUANT. PCR: <2.6 LOG CPY/ML

## 2020-10-02 ENCOUNTER — HOSPITAL ENCOUNTER (OUTPATIENT)
Facility: MEDICAL CENTER | Age: 30
End: 2020-10-02
Payer: COMMERCIAL

## 2020-10-09 ENCOUNTER — HOSPITAL ENCOUNTER (OUTPATIENT)
Facility: MEDICAL CENTER | Age: 30
End: 2020-10-09
Payer: COMMERCIAL

## 2020-10-15 ENCOUNTER — VIRTUAL VISIT (OUTPATIENT)
Dept: ONCOLOGY | Age: 30
End: 2020-10-15
Payer: COMMERCIAL

## 2020-10-15 ENCOUNTER — TELEPHONE (OUTPATIENT)
Dept: ONCOLOGY | Age: 30
End: 2020-10-15

## 2020-10-15 PROCEDURE — 99214 OFFICE O/P EST MOD 30 MIN: CPT | Performed by: INTERNAL MEDICINE

## 2020-10-15 PROCEDURE — G8427 DOCREV CUR MEDS BY ELIG CLIN: HCPCS | Performed by: INTERNAL MEDICINE

## 2020-10-15 RX ORDER — AZITHROMYCIN 500 MG/1
500 TABLET, FILM COATED ORAL DAILY
Qty: 1 PACKET | Refills: 0 | Status: SHIPPED | OUTPATIENT
Start: 2020-10-15 | End: 2020-10-18

## 2020-10-18 PROBLEM — D70.9 NEUTROPENIA (HCC): Status: ACTIVE | Noted: 2020-10-18

## 2020-10-18 NOTE — PROGRESS NOTES
_        10/15/2020    TELEHEALTH EVALUATION -- Audio/Visual (During YWOSG-36 public health emergency)    HPI:    Maddison Lozoya (:  1990) has requested an audio/video evaluation for the following concern(s):    Follow-up visit on neutropenia and elevated transaminases    Ms. Estrella Jennings is a very pleasant 34 y.o. female with history of multiple co morbidities as listed. Patient had follow-up with gastroenterology for multiple GI symptoms and was diagnosed with irritable bowel syndrome. She had recent problem with increasing abdominal discomfort and early satiety and increasing problem with weight loss. She had previous GI work-up and EGD with records not available to me. As per the patient no significant findings were seen. The patient had recent blood work which showed evidence of neutropenia. She is referred for further evaluation. She has no lymphadenopathy. Patient states that she was not feeling well at the time of that testing. She had excessive weakness and fatigue and feeling tired. No documented fever but she felt warm. She has generalized body aches. She had no appetite. She had no vomiting. No chest pain or shortness of breath. No headaches. Patient denies any history of repeated infections. No recent treatments. Patient had no history of smoking or alcohol drinking. Patient sister and father had history of hepatitis. INTERIM HISTORY:  The patient seen for follow-up neutropenia. Clinically stable. No fever. No infections. No enlarged lymph nodes. No episodes of use of breath. No chest pain or shortness of breath. No other complaints. Review of Systems    Prior to Visit Medications    Medication Sig Taking?  Authorizing Provider   azithromycin (ZITHROMAX) 500 MG tablet Take 1 tablet by mouth daily for 3 days Yes Rahul King MD   baclofen (LIORESAL) 10 MG tablet Discharge [x]  None visible  [] Abnormal -    HENT:   [x] Normocephalic, atraumatic. [] Abnormal   [x] Mouth/Throat: Mucous membranes are moist.     External Ears [x] Normal  [] Abnormal-     Neck: [x] No visualized mass     Pulmonary/Chest: [x] Respiratory effort normal.  [] No visualized signs of difficulty breathing or respiratory distress        [] Abnormal-      Musculoskeletal:   [x] Normal gait with no signs of ataxia         [x] Normal range of motion of neck        [] Abnormal-       Neurological:        [x] No Facial Asymmetry (Cranial nerve 7 motor function) (limited exam to video visit)          [x] No gaze palsy        [] Abnormal-         Skin:        [x] No significant exanthematous lesions or discoloration noted on facial skin         [] Abnormal-            Psychiatric:       [x] Normal Affect [x] No Hallucinations        [] Abnormal-     Other pertinent observable physical exam findings-     ASSESSMENT/PLAN:  1. Elevated transaminase level  2. Neutropenia    Labs are reviewed and discussed with the patient. Labs showed no serious abnormalities. She continues to have slightly repeated transaminases. However ultrasound of the liver and spleen is unremarkable. No other obvious cause for the patient's neutropenia. It is likely related to liver disease. No viral etiology. At this point recommend monitoring. We will repeat labs including liver function and CBC in about 4 to 6 months. If we see any deterioration bone marrow test can be done at that time. - CBC Auto Differential; Future  - Hepatic Function Panel; Future          Maddison Lozoya is a 34 y.o. female being evaluated by a Virtual Visit (video visit) encounter to address concerns as mentioned above. A caregiver was present when appropriate.  Due to this being a TeleHealth encounter (During [place] [address] public health emergency), evaluation of the following organ systems was limited:

## 2020-11-23 RX ORDER — GABAPENTIN 400 MG/1
400 CAPSULE ORAL 3 TIMES DAILY
Qty: 90 CAPSULE | Refills: 1 | OUTPATIENT
Start: 2020-11-23 | End: 2020-12-23

## 2020-11-23 RX ORDER — DULOXETIN HYDROCHLORIDE 20 MG/1
20 CAPSULE, DELAYED RELEASE ORAL DAILY
Qty: 30 CAPSULE | Refills: 1 | OUTPATIENT
Start: 2020-11-23

## 2020-11-23 NOTE — TELEPHONE ENCOUNTER
Tessy Kwok is requesting a refill on the following medications:   Requested Prescriptions     Pending Prescriptions Disp Refills    gabapentin (NEURONTIN) 400 MG capsule 90 capsule 1     Sig: Take 1 capsule by mouth 3 times daily for 30 days. Refused Prescriptions Disp Refills    DULoxetine (CYMBALTA) 20 MG extended release capsule 30 capsule 1     Sig: Take 1 capsule by mouth daily     Refused By: Andrew Gonzalez     Reason for Refusal: Patient needs an appointment       Last OV 7/7/20 vv    Future Appointments   Date Time Provider Jules Zamorano   3/8/2021 12:15 PM SCHEDULE, [facility] CANCER  [facility]   3/16/2021  4:00 PM Blanquita Reardon MD  [facility]       OARRS report sent to Dr. Eric Whalen through alternative route for review.

## 2020-12-18 ENCOUNTER — TELEMEDICINE (OUTPATIENT)
Dept: FAMILY MEDICINE CLINIC | Age: 30
End: 2020-12-18
Payer: COMMERCIAL

## 2020-12-18 PROCEDURE — G8484 FLU IMMUNIZE NO ADMIN: HCPCS | Performed by: INTERNAL MEDICINE

## 2020-12-18 PROCEDURE — G8427 DOCREV CUR MEDS BY ELIG CLIN: HCPCS | Performed by: INTERNAL MEDICINE

## 2020-12-18 PROCEDURE — 1036F TOBACCO NON-USER: CPT | Performed by: INTERNAL MEDICINE

## 2020-12-18 PROCEDURE — 99213 OFFICE O/P EST LOW 20 MIN: CPT | Performed by: INTERNAL MEDICINE

## 2020-12-18 PROCEDURE — G8420 CALC BMI NORM PARAMETERS: HCPCS | Performed by: INTERNAL MEDICINE

## 2020-12-18 RX ORDER — DULOXETIN HYDROCHLORIDE 20 MG/1
20 CAPSULE, DELAYED RELEASE ORAL DAILY
Qty: 30 CAPSULE | Refills: 1 | Status: SHIPPED | OUTPATIENT
Start: 2020-12-18 | End: 2021-04-01 | Stop reason: SDUPTHER

## 2020-12-18 RX ORDER — LINACLOTIDE 290 UG/1
CAPSULE, GELATIN COATED ORAL
COMMUNITY
Start: 2020-11-20

## 2020-12-18 RX ORDER — BACLOFEN 10 MG/1
10 TABLET ORAL 2 TIMES DAILY
Qty: 60 TABLET | Refills: 5 | Status: SHIPPED | OUTPATIENT
Start: 2020-12-18 | End: 2021-09-16 | Stop reason: SDUPTHER

## 2020-12-18 RX ORDER — GABAPENTIN 400 MG/1
400 CAPSULE ORAL 3 TIMES DAILY
Qty: 90 CAPSULE | Refills: 1 | Status: SHIPPED | OUTPATIENT
Start: 2020-12-18 | End: 2021-05-06

## 2020-12-18 RX ORDER — NAPROXEN 500 MG/1
500 TABLET ORAL 2 TIMES DAILY WITH MEALS
Qty: 60 TABLET | Refills: 3 | Status: SHIPPED | OUTPATIENT
Start: 2020-12-18 | End: 2021-07-15

## 2020-12-18 ASSESSMENT — PATIENT HEALTH QUESTIONNAIRE - PHQ9
SUM OF ALL RESPONSES TO PHQ9 QUESTIONS 1 & 2: 2
SUM OF ALL RESPONSES TO PHQ QUESTIONS 1-9: 2
2. FEELING DOWN, DEPRESSED OR HOPELESS: 1
SUM OF ALL RESPONSES TO PHQ QUESTIONS 1-9: 2
1. LITTLE INTEREST OR PLEASURE IN DOING THINGS: 1
SUM OF ALL RESPONSES TO PHQ QUESTIONS 1-9: 2

## 2020-12-18 ASSESSMENT — ENCOUNTER SYMPTOMS
STRIDOR: 0
WHEEZING: 0
CONSTIPATION: 1
ANAL BLEEDING: 0
COUGH: 0
ABDOMINAL PAIN: 0
BLOOD IN STOOL: 0
DIARRHEA: 0
SHORTNESS OF BREATH: 0
CHEST TIGHTNESS: 0
BACK PAIN: 1

## 2020-12-18 NOTE — PROGRESS NOTES
2020    TELEHEALTH EVALUATION -- Audio/Visual (During IXVMF-38 public health emergency)    HPI:    Tushar Cox (:  1990) has requested an audio/video evaluation for the following concern(s):    Here for med check   Has been two years since seen in person   Follows up with some specialists  Has been having a lot of trouble getting a hold of pain management   They were supposed to do another injection   Needs refills of all meds they give her as she has been out completely   Baclofen she uses as needed  Gabapentin and cybalata daily  No se from medication   Also seeing oncology for low wbc ; work up has been neg or normal and sees gi for ibs gets linzess from them   No other new issues or concerns  Has had a lot of blood work in the last year      Review of Systems   Constitutional: Negative for activity change, appetite change, chills, diaphoresis, fatigue, fever and unexpected weight change. Eyes: Negative for visual disturbance. Respiratory: Negative for cough, chest tightness, shortness of breath, wheezing and stridor. Cardiovascular: Negative for chest pain, palpitations and leg swelling. Gastrointestinal: Positive for constipation. Negative for abdominal pain, anal bleeding, blood in stool and diarrhea. Musculoskeletal: Positive for arthralgias, back pain and gait problem. Skin: Negative for rash. Neurological: Positive for numbness. Negative for headaches. Hematological: Negative for adenopathy. Does not bruise/bleed easily. Psychiatric/Behavioral: Negative for sleep disturbance. Prior to Visit Medications    Medication Sig Taking?  Authorizing Provider   LINZESS 290 MCG CAPS capsule TAKE 1 CAPSULE BY MOUTH ONE TIME A DAY Yes Historical Provider, MD   DULoxetine (CYMBALTA) 20 MG extended release capsule Take 1 capsule by mouth daily Yes Aviva Ang DO   naproxen (NAPROSYN) 500 MG tablet Take 1 tablet by mouth 2 times daily (with meals) Yes Aviva Ang DO baclofen (LIORESAL) 10 MG tablet Take 1 tablet by mouth 2 times daily Yes Jeff Delgado DO   gabapentin (NEURONTIN) 400 MG capsule Take 1 capsule by mouth 3 times daily for 30 days. Yes Jeff Delgado DO   Polyethylene Glycol 3350 (MIRALAX PO) Take by mouth 2 times daily  Yes Historical Provider, MD   melatonin 3 MG TABS tablet Take 5 mg by mouth nightly as needed Yes Historical Provider, MD   cetirizine (ZYRTEC) 10 MG tablet Take 1 tablet by mouth 2 times daily  Patient not taking: Reported on 12/18/2020  Deonna Crawford MD   triamcinolone (KENALOG) 0.1 % ointment Apply to rash twice daily (not face, armpit or groin)  Deonna Crawford MD       Social History     Tobacco Use    Smoking status: Never Smoker    Smokeless tobacco: Never Used   Substance Use Topics    Alcohol use: Yes     Comment: socially    Drug use: No            PHYSICAL EXAMINATION:  [ INSTRUCTIONS:  \"[x]\" Indicates a positive item  \"[]\" Indicates a negative item  -- DELETE ALL ITEMS NOT EXAMINED]  Vital Signs: (As obtained by patient/caregiver or practitioner observation)    Blood pressure-  Heart rate-    Respiratory rate-    Temperature-  Pulse oximetry-     Constitutional: [x] Appears well-developed and well-nourished [x] No apparent distress      [] Abnormal-   Mental status  [x] Alert and awake  [x] Oriented to person/place/time [x]Able to follow commands      Eyes:  EOM    [x]  Normal  [] Abnormal-  Sclera  [x]  Normal  [] Abnormal -         Discharge [x]  None visible  [] Abnormal -    HENT:   [x] Normocephalic, atraumatic.   [] Abnormal   [x] Mouth/Throat: Mucous membranes are moist.     External Ears [x] Normal  [] Abnormal-     Neck: [x] No visualized mass     Pulmonary/Chest: [x] Respiratory effort normal.  [x] No visualized signs of difficulty breathing or respiratory distress        [] Abnormal-      Musculoskeletal:   [x] Normal gait with no signs of ataxia         [x] Normal range of motion of neck        [] Abnormal- Neurological:        [x] No Facial Asymmetry (Cranial nerve 7 motor function) (limited exam to video visit)          [x] No gaze palsy        [] Abnormal-         Skin:        [] No significant exanthematous lesions or discoloration noted on facial skin         [] Abnormal-            Psychiatric:       [x] Normal Affect [] No Hallucinations        [] Abnormal-     Other pertinent observable physical exam findings-     ASSESSMENT/PLAN:  1. Chronic bilateral low back pain with right-sided sciatica  - DULoxetine (CYMBALTA) 20 MG extended release capsule; Take 1 capsule by mouth daily  Dispense: 30 capsule; Refill: 1  - gabapentin (NEURONTIN) 400 MG capsule; Take 1 capsule by mouth 3 times daily for 30 days. Dispense: 90 capsule; Refill: 1    2. Bulge of lumbar disc without myelopathy    - DULoxetine (CYMBALTA) 20 MG extended release capsule; Take 1 capsule by mouth daily  Dispense: 30 capsule; Refill: 1  - gabapentin (NEURONTIN) 400 MG capsule; Take 1 capsule by mouth 3 times daily for 30 days. Dispense: 90 capsule; Refill: 1    3. Chronic pain of right knee  - naproxen (NAPROSYN) 500 MG tablet; Take 1 tablet by mouth 2 times daily (with meals)  Dispense: 60 tablet; Refill: 3    4. Chronic right hip pain  - naproxen (NAPROSYN) 500 MG tablet; Take 1 tablet by mouth 2 times daily (with meals)  Dispense: 60 tablet; Refill: 3    Refilled medications  Reviewed recent blood work and specialists /consult notes  F/u with specialists as scheduled   Call with q/c  Controlled substances monitoring: possible medication side effects, risk of tolerance and/or dependence, and alternative treatments discussed, no signs of potential drug abuse or diversion identified and OARRS report reviewed today- activity consistent with treatment plan. No follow-ups on file. Lorraine Cummings is a 27 y.o. female being evaluated by a Virtual Visit (video visit) encounter to address concerns as mentioned above.   A caregiver was present when appropriate. Due to this being a TeleHealth encounter (During WMSRR-83 public health emergency), evaluation of the following organ systems was limited: Vitals/Constitutional/EENT/Resp/CV/GI//MS/Neuro/Skin/Heme-Lymph-Imm. Pursuant to the emergency declaration under the [address] [place] [address], [address] [place] [address] and the [employer] and [employer] Act, this Virtual Visit was conducted with patient's (and/or legal guardian's) consent, to reduce the patient's risk of exposure to COVID-19 and provide necessary medical care. The patient (and/or legal guardian) has also been advised to contact this office for worsening conditions or problems, and seek emergency medical treatment and/or call 911 if deemed necessary. Patient identification was verified at the start of the visit: Yes    Total time spent on this encounter: 15 minutes    Services were provided through a video synchronous discussion virtually to substitute for in-person clinic visit. Patient and provider were located at their individual homes. --Anne Marie Welsh DO on 12/18/2020 at 10:34 AM    An electronic signature was used to authenticate this note.

## 2021-03-08 ENCOUNTER — HOSPITAL ENCOUNTER (OUTPATIENT)
Facility: MEDICAL CENTER | Age: 31
Discharge: HOME | End: 2021-03-08
Payer: COMMERCIAL

## 2021-03-08 DIAGNOSIS — R74.01 ELEVATED TRANSAMINASE LEVEL: ICD-10-CM

## 2021-03-08 LAB
ABSOLUTE EOS #: 0.04 K/UL (ref 0–0.44)
ABSOLUTE IMMATURE GRANULOCYTE: 0.01 K/UL (ref 0–0.3)
ABSOLUTE LYMPH #: 1.03 K/UL (ref 1.1–3.7)
ABSOLUTE MONO #: 0.29 K/UL (ref 0.1–1.2)
ALBUMIN SERPL-MCNC: 4.5 G/DL (ref 3.5–5.2)
ALBUMIN/GLOBULIN RATIO: ABNORMAL (ref 1–2.5)
ALP BLD-CCNC: 87 U/L (ref 35–104)
ALT SERPL-CCNC: 109 U/L (ref 5–33)
AST SERPL-CCNC: 48 U/L
BASOPHILS # BLD: 1 % (ref 0–2)
BASOPHILS ABSOLUTE: 0.03 K/UL (ref 0–0.2)
BILIRUB SERPL-MCNC: 0.69 MG/DL (ref 0.3–1.2)
BILIRUBIN DIRECT: 0.2 MG/DL
BILIRUBIN, INDIRECT: 0.49 MG/DL (ref 0–1)
DIFFERENTIAL TYPE: ABNORMAL
EOSINOPHILS RELATIVE PERCENT: 1 % (ref 1–4)
GLOBULIN: ABNORMAL G/DL (ref 1.5–3.8)
HCT VFR BLD CALC: 41.6 % (ref 36.3–47.1)
HEMOGLOBIN: 14.1 G/DL (ref 11.9–15.1)
IMMATURE GRANULOCYTES: 0 %
LYMPHOCYTES # BLD: 31 % (ref 24–43)
MCH RBC QN AUTO: 32.3 PG (ref 25.2–33.5)
MCHC RBC AUTO-ENTMCNC: 33.9 G/DL (ref 28.4–34.8)
MCV RBC AUTO: 95.2 FL (ref 82.6–102.9)
MONOCYTES # BLD: 9 % (ref 3–12)
NRBC AUTOMATED: 0 PER 100 WBC
PDW BLD-RTO: 12.5 % (ref 11.8–14.4)
PLATELET # BLD: 167 K/UL (ref 138–453)
PLATELET ESTIMATE: ABNORMAL
PMV BLD AUTO: 10.7 FL (ref 8.1–13.5)
RBC # BLD: 4.37 M/UL (ref 3.95–5.11)
RBC # BLD: ABNORMAL 10*6/UL
SEG NEUTROPHILS: 58 % (ref 36–65)
SEGMENTED NEUTROPHILS ABSOLUTE COUNT: 1.9 K/UL (ref 1.5–8.1)
TOTAL PROTEIN: 7.3 G/DL (ref 6.4–8.3)
WBC # BLD: 3.3 K/UL (ref 3.5–11.3)
WBC # BLD: ABNORMAL 10*3/UL

## 2021-03-08 PROCEDURE — 85025 COMPLETE CBC W/AUTO DIFF WBC: CPT

## 2021-03-08 PROCEDURE — 80076 HEPATIC FUNCTION PANEL: CPT

## 2021-03-08 PROCEDURE — 36415 COLL VENOUS BLD VENIPUNCTURE: CPT

## 2021-03-11 ENCOUNTER — HOSPITAL ENCOUNTER (OUTPATIENT)
Facility: MEDICAL CENTER | Age: 31
End: 2021-03-11
Payer: COMMERCIAL

## 2021-03-16 ENCOUNTER — VIRTUAL VISIT (OUTPATIENT)
Dept: ONCOLOGY | Age: 31
End: 2021-03-16
Payer: COMMERCIAL

## 2021-03-16 DIAGNOSIS — R74.01 ELEVATED TRANSAMINASE LEVEL: ICD-10-CM

## 2021-03-16 DIAGNOSIS — D70.9 NEUTROPENIA, UNSPECIFIED TYPE (HCC): Primary | ICD-10-CM

## 2021-03-16 PROCEDURE — 99214 OFFICE O/P EST MOD 30 MIN: CPT | Performed by: INTERNAL MEDICINE

## 2021-03-16 PROCEDURE — G8427 DOCREV CUR MEDS BY ELIG CLIN: HCPCS | Performed by: INTERNAL MEDICINE

## 2021-03-16 PROCEDURE — 99211 OFF/OP EST MAY X REQ PHY/QHP: CPT | Performed by: INTERNAL MEDICINE

## 2021-03-16 NOTE — PROGRESS NOTES
_        3/16/2021    TELEHEALTH EVALUATION -- Audio/Visual (During QWOJJ-83 public health emergency)    HPI:    Olamide Brewer (:  1990) has requested an audio/video evaluation for the following concern(s):    Follow-up visit on neutropenia and elevated transaminases    Ms. Lee Rodriguez is a very pleasant 34 y.o. female with history of multiple co morbidities as listed. Patient had follow-up with gastroenterology for multiple GI symptoms and was diagnosed with irritable bowel syndrome. She had recent problem with increasing abdominal discomfort and early satiety and increasing problem with weight loss. She had previous GI work-up and EGD with records not available to me. As per the patient no significant findings were seen. The patient had recent blood work which showed evidence of neutropenia. She is referred for further evaluation. She has no lymphadenopathy. Patient states that she was not feeling well at the time of that testing. She had excessive weakness and fatigue and feeling tired. No documented fever but she felt warm. She has generalized body aches. She had no appetite. She had no vomiting. No chest pain or shortness of breath. No headaches. Patient denies any history of repeated infections. No recent treatments. Patient had no history of smoking or alcohol drinking. Patient sister and father had history of hepatitis. INTERIM HISTORY:  The patient seen for follow-up neutropenia. Clinically stable. No fever. No infections. No enlarged lymph nodes. No episodes of use of breath. No chest pain or shortness of breath. No other complaints. Review of Systems    Prior to Visit Medications    Medication Sig Taking?  Authorizing Provider   LINZESS 290 MCG CAPS capsule TAKE 1 CAPSULE BY MOUTH ONE TIME A DAY Yes Historical Provider, MD   DULoxetine (CYMBALTA) 20 MG extended release capsule Take 1 capsule by mouth daily Yes Zoë Samaniego,    baclofen (LIORESAL) 10 MG tablet Take 1 tablet by mouth 2 times daily Yes Zoë Samaniego, DO   gabapentin (NEURONTIN) 400 MG capsule Take 1 capsule by mouth 3 times daily for 30 days. Yes Zoë Samaniego, DO   Polyethylene Glycol 3350 (MIRALAX PO) Take by mouth 2 times daily  Yes Historical Provider, MD   melatonin 3 MG TABS tablet Take 5 mg by mouth nightly as needed Yes Historical Provider, MD   naproxen (NAPROSYN) 500 MG tablet Take 1 tablet by mouth 2 times daily (with meals)  Patient not taking: Reported on 3/16/2021  Zoë Samaniego DO   cetirizine (ZYRTEC) 10 MG tablet Take 1 tablet by mouth 2 times daily  Patient not taking: Reported on 3/16/2021  Awanda Fothergill, MD   triamcinolone (KENALOG) 0.1 % ointment Apply to rash twice daily (not face, armpit or groin)  Patient taking differently: Indications: PRN Apply to rash twice daily (not face, armpit or groin)  Awanda Fothergill, MD       Social History     Tobacco Use    Smoking status: Never Smoker    Smokeless tobacco: Never Used   Substance Use Topics    Alcohol use: Yes     Comment: socially    Drug use: No            PHYSICAL EXAMINATION:  [ INSTRUCTIONS:  \"[x]\" Indicates a positive item  \"[]\" Indicates a negative item  -- DELETE ALL ITEMS NOT EXAMINED]  Vital Signs: (As obtained by patient/caregiver or practitioner observation)    Blood pressure-  Heart rate-    Respiratory rate-    Temperature-  Pulse oximetry-     Constitutional: [x] Appears well-developed and well-nourished [] No apparent distress      [] Abnormal-   Mental status  [x] Alert and awake  [x] Oriented to person/place/time [x]Able to follow commands      Eyes:  EOM    [x]  Normal  [] Abnormal-  Sclera  [x]  Normal  [] Abnormal -         Discharge [x]  None visible  [] Abnormal -    HENT:   [x] Normocephalic, atraumatic.   [] Abnormal   [x] Mouth/Throat: Mucous membranes are moist.     External Ears [x] Normal  [] Abnormal-     Neck: [x] No visualized mass     Pulmonary/Chest: [x] Respiratory effort normal.  [] No visualized signs of difficulty breathing or respiratory distress        [] Abnormal-      Musculoskeletal:   [x] Normal gait with no signs of ataxia         [x] Normal range of motion of neck        [] Abnormal-       Neurological:        [x] No Facial Asymmetry (Cranial nerve 7 motor function) (limited exam to video visit)          [x] No gaze palsy        [] Abnormal-         Skin:        [x] No significant exanthematous lesions or discoloration noted on facial skin         [] Abnormal-            Psychiatric:       [x] Normal Affect [x] No Hallucinations        [] Abnormal-     Other pertinent observable physical exam findings-     ASSESSMENT/PLAN:  1. Elevated transaminase level  2. Neutropenia    Labs are reviewed and discussed with the patient. Labs showed no serious abnormalities. She continues to have mild leukopenia. No significant changes. Repeated transaminases show significant increase. Hal Bahena However ultrasound of the liver and spleen is unremarkable. Will refer to gastroenterology for further work-up for her liver. Patient previously had colonoscopy and EGD by Dr. Pretty Park. We will make referral.   We will see her in 6 months with repeated labs. Sooner for any problems.     - CBC Auto Differential; Future  - Hepatic Function Panel; Future          Stefano Chew is a 27 y.o. female being evaluated by a Virtual Visit (video visit) encounter to address concerns as mentioned above. A caregiver was present when appropriate. Due to this being a TeleHealth encounter (During VQMOG-14 public health emergency), evaluation of the following organ systems was limited: Vitals/Constitutional/EENT/Resp/CV/GI//MS/Neuro/Skin/Heme-Lymph-Imm.   Pursuant to the emergency declaration under the [facility], [address] [place] [address] authority and the [facility] Act, this Virtual Visit was conducted with patient's (and/or legal guardian's) consent, to reduce the patient's risk of exposure to COVID-19 and provide necessary medical care. The patient (and/or legal guardian) has also been advised to contact this office for worsening conditions or problems, and seek emergency medical treatment and/or call 911 if deemed necessary. Patient identification was verified at the start of the visit: Yes    Total time spent on this encounter: Not billed by time    Services were provided through a video synchronous discussion virtually to substitute for in-person clinic visit. Patient and provider were located at their individual homes. --Rodrick Vera MD on 3/16/2021 at 5:23 PM    An electronic signature was used to authenticate this note.

## 2021-03-16 NOTE — PATIENT INSTRUCTIONS
Refer to [facility] Dr Chadd Fontana for elevated transaminases  RV 6 months with labs

## 2021-03-17 ENCOUNTER — TELEPHONE (OUTPATIENT)
Dept: ONCOLOGY | Age: 31
End: 2021-03-17

## 2021-04-01 DIAGNOSIS — G89.29 CHRONIC BILATERAL LOW BACK PAIN WITH RIGHT-SIDED SCIATICA: Chronic | ICD-10-CM

## 2021-04-01 DIAGNOSIS — M51.36 BULGE OF LUMBAR DISC WITHOUT MYELOPATHY: ICD-10-CM

## 2021-04-01 DIAGNOSIS — M54.41 CHRONIC BILATERAL LOW BACK PAIN WITH RIGHT-SIDED SCIATICA: Chronic | ICD-10-CM

## 2021-04-04 RX ORDER — DULOXETIN HYDROCHLORIDE 20 MG/1
20 CAPSULE, DELAYED RELEASE ORAL DAILY
Qty: 30 CAPSULE | Refills: 1 | Status: SHIPPED | OUTPATIENT
Start: 2021-04-04 | End: 2021-06-06

## 2021-05-05 DIAGNOSIS — M54.41 CHRONIC BILATERAL LOW BACK PAIN WITH RIGHT-SIDED SCIATICA: Chronic | ICD-10-CM

## 2021-05-05 DIAGNOSIS — G89.29 CHRONIC BILATERAL LOW BACK PAIN WITH RIGHT-SIDED SCIATICA: Chronic | ICD-10-CM

## 2021-05-05 DIAGNOSIS — M51.36 BULGE OF LUMBAR DISC WITHOUT MYELOPATHY: ICD-10-CM

## 2021-05-06 RX ORDER — GABAPENTIN 400 MG/1
CAPSULE ORAL
Qty: 90 CAPSULE | Refills: 0 | Status: SHIPPED | OUTPATIENT
Start: 2021-05-06 | End: 2021-06-28 | Stop reason: SDUPTHER

## 2021-06-06 DIAGNOSIS — G89.29 CHRONIC BILATERAL LOW BACK PAIN WITH RIGHT-SIDED SCIATICA: Chronic | ICD-10-CM

## 2021-06-06 DIAGNOSIS — M51.36 BULGE OF LUMBAR DISC WITHOUT MYELOPATHY: ICD-10-CM

## 2021-06-06 DIAGNOSIS — M54.41 CHRONIC BILATERAL LOW BACK PAIN WITH RIGHT-SIDED SCIATICA: Chronic | ICD-10-CM

## 2021-06-06 RX ORDER — DULOXETIN HYDROCHLORIDE 20 MG/1
CAPSULE, DELAYED RELEASE ORAL
Qty: 30 CAPSULE | Refills: 0 | Status: SHIPPED | OUTPATIENT
Start: 2021-06-06 | End: 2021-07-15 | Stop reason: DRUGHIGH

## 2021-06-28 DIAGNOSIS — M54.41 CHRONIC BILATERAL LOW BACK PAIN WITH RIGHT-SIDED SCIATICA: Chronic | ICD-10-CM

## 2021-06-28 DIAGNOSIS — G89.29 CHRONIC BILATERAL LOW BACK PAIN WITH RIGHT-SIDED SCIATICA: Chronic | ICD-10-CM

## 2021-06-28 DIAGNOSIS — M51.36 BULGE OF LUMBAR DISC WITHOUT MYELOPATHY: ICD-10-CM

## 2021-06-28 RX ORDER — GABAPENTIN 400 MG/1
CAPSULE ORAL
Qty: 90 CAPSULE | Refills: 0 | Status: SHIPPED | OUTPATIENT
Start: 2021-06-28 | End: 2021-09-16

## 2021-07-15 ENCOUNTER — OFFICE VISIT (OUTPATIENT)
Dept: FAMILY MEDICINE CLINIC | Age: 31
End: 2021-07-15
Payer: COMMERCIAL

## 2021-07-15 VITALS
DIASTOLIC BLOOD PRESSURE: 62 MMHG | BODY MASS INDEX: 22.08 KG/M2 | HEIGHT: 62 IN | TEMPERATURE: 98 F | HEART RATE: 74 BPM | OXYGEN SATURATION: 97 % | SYSTOLIC BLOOD PRESSURE: 110 MMHG | WEIGHT: 120 LBS

## 2021-07-15 DIAGNOSIS — T14.8XXA BLISTER: ICD-10-CM

## 2021-07-15 DIAGNOSIS — G89.29 CHRONIC BILATERAL LOW BACK PAIN WITH RIGHT-SIDED SCIATICA: Primary | ICD-10-CM

## 2021-07-15 DIAGNOSIS — F41.9 ANXIETY: ICD-10-CM

## 2021-07-15 DIAGNOSIS — M54.41 CHRONIC BILATERAL LOW BACK PAIN WITH RIGHT-SIDED SCIATICA: Primary | ICD-10-CM

## 2021-07-15 PROCEDURE — 99213 OFFICE O/P EST LOW 20 MIN: CPT | Performed by: PHYSICIAN ASSISTANT

## 2021-07-15 PROCEDURE — 1036F TOBACCO NON-USER: CPT | Performed by: PHYSICIAN ASSISTANT

## 2021-07-15 PROCEDURE — G8427 DOCREV CUR MEDS BY ELIG CLIN: HCPCS | Performed by: PHYSICIAN ASSISTANT

## 2021-07-15 PROCEDURE — G8420 CALC BMI NORM PARAMETERS: HCPCS | Performed by: PHYSICIAN ASSISTANT

## 2021-07-15 RX ORDER — PREDNISONE 10 MG/1
TABLET ORAL PRN
COMMUNITY
End: 2021-07-15

## 2021-07-15 RX ORDER — DULOXETIN HYDROCHLORIDE 30 MG/1
30 CAPSULE, DELAYED RELEASE ORAL DAILY
Qty: 30 CAPSULE | Refills: 3 | Status: SHIPPED | OUTPATIENT
Start: 2021-07-15 | End: 2021-12-15

## 2021-07-15 ASSESSMENT — PATIENT HEALTH QUESTIONNAIRE - PHQ9
SUM OF ALL RESPONSES TO PHQ QUESTIONS 1-9: 0
SUM OF ALL RESPONSES TO PHQ QUESTIONS 1-9: 0
SUM OF ALL RESPONSES TO PHQ9 QUESTIONS 1 & 2: 0
SUM OF ALL RESPONSES TO PHQ QUESTIONS 1-9: 0
2. FEELING DOWN, DEPRESSED OR HOPELESS: 0
1. LITTLE INTEREST OR PLEASURE IN DOING THINGS: 0

## 2021-07-15 ASSESSMENT — ENCOUNTER SYMPTOMS
COLOR CHANGE: 0
TROUBLE SWALLOWING: 0
BACK PAIN: 1
SORE THROAT: 0
SHORTNESS OF BREATH: 0
COUGH: 0

## 2021-07-15 NOTE — PROGRESS NOTES
[address] [facility]  [address] [facility] [address] [address]  Dept: [phone]  Dept Fax: [phone]    Mohan Rosas is a 27 y.o. female who presents today for her medical conditions/complaintsas noted below. Mohan Rosas is c/o of   Chief Complaint   Patient presents with    Back Pain     chronic lower back pain    Mouth Lesions     blood blisters on her tongue x 5/2021 first came about          HPI:     HPI    Patient new to provider. She was previously following with Dr. Melody Newman. 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 Has history of chronic low back pain with sciatrica typically worse on the right side. She states has been seeing Dr. Angel Anderson for a few years for steroid injections. She did not get in last year due to the pandemic and would like to get back in with him. Dr. Meldoy Newman was helping her manage her medication, currently using baclofen and neurontin. Also on cymbalta and seems to help. She does note that she gets intermittent anxiety and does feel the cymbalta helps with that some. She is interested in trying a higher dose. She states she does not feel the gabapentin does not really help any with her nerve pain. She feels the baclofen helps the most. She would like to get off the gabapentin. She states she has been having low WBC since May 2021. She noticed that at that time she had a blood blister under her tongue at that same time. She states at one point it did drain but eventually healed. She has had smaller subsequent lesions on the same area of the tongue. She states they have healed on there own. She has a new one today that is more on the right side.      Hemoglobin A1C (%)   Date Value   07/08/2020 4.5             ( goal A1Cis < 7)   No results found for: LABMICR  LDL Cholesterol (mg/dL)   Date Value   06/26/2018 70       (goal LDL is <100)   AST (U/L)   Date Value   03/08/2021 48 (H) ALT (U/L)   Date Value   03/08/2021 109 (H)     BUN (mg/dL)   Date Value   07/08/2020 6     BP Readings from Last 3 Encounters:   07/15/21 110/62   08/11/20 115/85   02/17/20 (!) 101/59          (goal 120/80)    Past Medical History:   Diagnosis Date    Chronic back pain     Colon polyps     follows with Dr. Jordi Higginbotham for colonoscopy every 5 years    IBS (irritable bowel syndrome)     Neutropenia (HCC)     Dr. Te Galvin following    Osteoarthritis       Past Surgical History:   Procedure Laterality Date    ANESTHESIA NERVE BLOCK Right 7/29/2019    TRANSFORAMINAL LUMBAR EPIDURAL STEROID INJECTION AT L4 L5 RIGHT performed by Kvng Gardner MD at [address] [place] [address]  01/23/2019    lumbar epidural right, decadron 10    NERVE BLOCK Right 02/06/2019    right lumbar epidural- decadron 10 mg    NERVE BLOCK  02/17/2020    Procedures:  Epidural Steroid Injection Bilateral L5s1    OTHER SURGICAL HISTORY Right 07/29/2019    TRANSFORAMINAL LUMBAR EPIDURAL STEROID INJECTION AT L4 L5 RIGHT (Right )    PAIN MANAGEMENT PROCEDURE Bilateral 2/17/2020    EPIDURAL STEROID INJECTION BILATERAL L5S1 performed by Kvng Gardner MD at [facility] Bilateral 06/2018       Family History   Problem Relation Age of Onset    Heart Defect Mother         murmur    Diabetes Father     Heart Defect Father         hepatitis c    Heart Defect Sister         neutropenia, hepatitis c    Cancer Maternal Grandfather         prostate    No Known Problems Other        Social History     Tobacco Use    Smoking status: Never Smoker    Smokeless tobacco: Never Used   Substance Use Topics    Alcohol use: Yes     Comment: socially      Current Outpatient Medications   Medication Sig Dispense Refill    DULoxetine (CYMBALTA) 30 MG extended release capsule Take 1 capsule by mouth daily 30 capsule 3    gabapentin (NEURONTIN) 400 MG capsule TAKE 1 CAPSULE BY MOUTH THREE TIMES A DAY 90 capsule 0    LINZESS 290 MCG CAPS capsule TAKE 1 CAPSULE BY MOUTH ONE TIME A DAY      baclofen (LIORESAL) 10 MG tablet Take 1 tablet by mouth 2 times daily 60 tablet 5    Polyethylene Glycol 3350 (MIRALAX PO) Take by mouth 2 times daily       melatonin 3 MG TABS tablet Take 5 mg by mouth nightly as needed      triamcinolone (KENALOG) 0.1 % ointment Apply to rash twice daily (not face, armpit or groin) 80 g 2     No current facility-administered medications for this visit. Allergies   Allergen Reactions    Latex Hives       Health Maintenance   Topic Date Due    COVID-19 Vaccine (1) Never done    DTaP/Tdap/Td vaccine (1 - Tdap) Never done    Cervical cancer screen  06/06/2021    Flu vaccine (1) 09/01/2021    Hepatitis C screen  Completed    HIV screen  Completed    Hepatitis A vaccine  Aged Out    Hepatitis B vaccine  Aged Out    Hib vaccine  Aged Out    Meningococcal (ACWY) vaccine  Aged Out    Pneumococcal 0-64 years Vaccine  Aged Out    Varicella vaccine  Discontinued       Subjective:     Review of Systems   Constitutional: Negative for activity change, appetite change, fatigue, fever and unexpected weight change. /62 (Site: Left Upper Arm, Position: Sitting, Cuff Size: Medium Adult)   Pulse 74   Temp 98 °F (36.7 °C) (Tympanic)   Ht 5' 2\" (1.575 m)   Wt 120 lb (54.4 kg)   SpO2 97%   BMI 21.95 kg/m²    HENT: Negative for mouth sores, sore throat (tongue lesion) and trouble swallowing. Respiratory: Negative for cough and shortness of breath. Cardiovascular: Negative for chest pain. Musculoskeletal: Positive for back pain (chronic). Negative for gait problem. Skin: Negative for color change, pallor, rash and wound. Neurological: Negative for weakness. Psychiatric/Behavioral: The patient is not nervous/anxious. Objective:     Physical Exam  Vitals and nursing note reviewed. Constitutional:       General: She is not in acute distress. Appearance: Normal appearance. She is well-developed. She is not ill-appearing. HENT:      Head: Normocephalic and atraumatic. Mouth/Throat:      Mouth: Mucous membranes are moist.      Dentition: No gum lesions. Tongue: Lesions (inferior right lateral tongue with small blood blister present) present. Skin:     General: Skin is warm and dry. Coloration: Skin is not pale. Findings: No erythema or rash. Neurological:      Mental Status: She is alert and oriented to person, place, and time. Gait: Gait normal.   Psychiatric:         Mood and Affect: Mood normal.         Behavior: Behavior normal.         Thought Content: Thought content normal.         Judgment: Judgment normal.       /62 (Site: Left Upper Arm, Position: Sitting, Cuff Size: Medium Adult)   Pulse 74   Temp 98 °F (36.7 °C) (Tympanic)   Ht 5' 2\" (1.575 m)   Wt 120 lb (54.4 kg)   SpO2 97%   BMI 21.95 kg/m²     Assessment:       Diagnosis Orders   1. Chronic bilateral low back pain with right-sided sciatica  Mike Persaud MD, Pain Management, [place]    DULoxetine (CYMBALTA) 30 MG extended release capsule   2. Anxiety  DULoxetine (CYMBALTA) 30 MG extended release capsule   3. Blister               Plan:      Return in about 1 month (around 8/15/2021) for medication check up. Updated referral to Dr. Haley Sprague. Patient interested in getting another injection at this time. She will call to schedule. She does not feel any benefit from the neruontin and would like to come off this. Recommended she drop down to 2 tablets a day for 2 weeks then 1 tablet additional week and off. Patient interested in increase in the cymbalta as this has been effective on her back pain as well as anxiety. Will increase to 30mg daily and recheck in 1 mo  Patient making appointment with hematologist to review the oral blister. I also recommended she see her dentist to see if any relationship there. ?  Biting/clenching at night causing trauma  Patient agreed with treatment plan    Orders Placed This Encounter   Procedures   Ivelisse Norman MD, Pain Management, [place]     Referral Priority:   Routine     Referral Type:   Eval and Treat     Referral Reason:   Specialty Services Required     Referred to Provider:   Lynette Anderson MD     Requested Specialty:   Pain Management     Number of Visits Requested:   1     Orders Placed This Encounter   Medications    DULoxetine (CYMBALTA) 30 MG extended release capsule     Sig: Take 1 capsule by mouth daily     Dispense:  30 capsule     Refill:  3       Patient given educational materials - see patient instructions. Discussed use, benefit, and side effects of prescribed medications. All patientquestions answered. Pt voiced understanding. Reviewed health maintenance. Instructedto continue current medications, diet and exercise. Patient agreed with treatmentplan. Follow up as directed.      Electronically signed by Georgia Keyes PA-C on 7/15/2021 at 10:56 AM

## 2021-08-17 ENCOUNTER — OFFICE VISIT (OUTPATIENT)
Dept: PAIN MANAGEMENT | Age: 31
End: 2021-08-17
Payer: COMMERCIAL

## 2021-08-17 VITALS
SYSTOLIC BLOOD PRESSURE: 100 MMHG | RESPIRATION RATE: 16 BRPM | BODY MASS INDEX: 22.19 KG/M2 | HEIGHT: 62 IN | DIASTOLIC BLOOD PRESSURE: 68 MMHG | HEART RATE: 74 BPM | WEIGHT: 120.6 LBS | OXYGEN SATURATION: 98 %

## 2021-08-17 DIAGNOSIS — M54.41 CHRONIC BILATERAL LOW BACK PAIN WITH RIGHT-SIDED SCIATICA: Primary | Chronic | ICD-10-CM

## 2021-08-17 DIAGNOSIS — M51.36 BULGE OF LUMBAR DISC WITHOUT MYELOPATHY: ICD-10-CM

## 2021-08-17 DIAGNOSIS — G89.29 CHRONIC BILATERAL LOW BACK PAIN WITH RIGHT-SIDED SCIATICA: Primary | Chronic | ICD-10-CM

## 2021-08-17 PROCEDURE — 99214 OFFICE O/P EST MOD 30 MIN: CPT | Performed by: ANESTHESIOLOGY

## 2021-08-17 PROCEDURE — G8420 CALC BMI NORM PARAMETERS: HCPCS | Performed by: ANESTHESIOLOGY

## 2021-08-17 PROCEDURE — 1036F TOBACCO NON-USER: CPT | Performed by: ANESTHESIOLOGY

## 2021-08-17 PROCEDURE — G8427 DOCREV CUR MEDS BY ELIG CLIN: HCPCS | Performed by: ANESTHESIOLOGY

## 2021-08-17 ASSESSMENT — ENCOUNTER SYMPTOMS: BACK PAIN: 1

## 2021-08-17 NOTE — PROGRESS NOTES
The patient is a 27 y. o. Non- / non  female. Chief Complaint   Patient presents with    Follow-up    Back Pain        HPI   Very pleasant 72-year-old female who was last seen in my clinic more than 1 year ago  She is presenting with recurrence of her chronic back pain issue  Pain located in the lower back area across midline with radiation down right leg  Associated symptoms include right leg numbness  Describes the pain as aching throbbing burning sensation  Pain aggravated with lifting bending twisting turning and routine daily work life  Pain is interfering with quality of life  She denies any loss of bladder or bowel control  No history of fever chills or weight loss    She has been doing home exercises  She has lost 100 pounds over last 1 year with the dietary changes  She has done multiple courses of physical therapy and continue to follow home exercise program    Had MRI lumbar spine that had shown L5 4-L5 level disc disease  She had epidural injection 1 year ago that provided her more than 50% relief with significant improvement in activity tolerance lasting for several months  Pain is now back to the baseline  Epidural injection have worked for her in the past and he is requesting repeat injection     Pt is here for follow up for back pain, pt states her pain is a5 /10. Pt states pain is aggravated by sitting standing lifting, laying down for extended time. Pt states her pain is alleviated by stretching helps but does not alleviate pain     Pt states characters are shooting burning    Does pain radiate?    B/l thigh-  Right thigh is constant pain  Left is intermittent pain     Right side numbness to toes    Past Medical History:   Diagnosis Date    Chronic back pain     Colon polyps     follows with Dr. Verner Bienenstock for colonoscopy every 5 years    IBS (irritable bowel syndrome)     Neutropenia (HCC)     Dr. Jordyn Johnson following    Osteoarthritis       Past Surgical History: Organizations:     Attends Club or Organization Meetings:     Marital Status:    Intimate Partner Violence:     Fear of Current or Ex-Partner:     Emotionally Abused:     Physically Abused:     Sexually Abused:      Family History   Problem Relation Age of Onset    Heart Defect Mother         murmur    Diabetes Father     Heart Defect Father         hepatitis c    Heart Defect Sister         neutropenia, hepatitis c    Cancer Maternal Grandfather         prostate    No Known Problems Other      Allergies   Allergen Reactions    Latex Hives     Latex   Vitals:    08/17/21 0904   BP: 100/68   Pulse: 74   Resp: 16   SpO2: 98%     Current Outpatient Medications   Medication Sig Dispense Refill    DULoxetine (CYMBALTA) 30 MG extended release capsule Take 1 capsule by mouth daily 30 capsule 3    gabapentin (NEURONTIN) 400 MG capsule TAKE 1 CAPSULE BY MOUTH THREE TIMES A DAY 90 capsule 0    LINZESS 290 MCG CAPS capsule TAKE 1 CAPSULE BY MOUTH ONE TIME A DAY      baclofen (LIORESAL) 10 MG tablet Take 1 tablet by mouth 2 times daily 60 tablet 5    Polyethylene Glycol 3350 (MIRALAX PO) Take by mouth 2 times daily       melatonin 3 MG TABS tablet Take 5 mg by mouth nightly as needed      triamcinolone (KENALOG) 0.1 % ointment Apply to rash twice daily (not face, armpit or groin) 80 g 2     No current facility-administered medications for this visit. Review of Systems   Constitutional: Negative. Negative for fever. Musculoskeletal: Positive for back pain. Neurological: Positive for numbness. Psychiatric/Behavioral: Positive for sleep disturbance. Trouble staying asleep          Objective:  General Appearance:  Uncomfortable and in pain. Vital signs: (most recent): Blood pressure 100/68, pulse 74, resp. rate 16, height 5' 2\" (1.575 m), weight 120 lb 9.6 oz (54.7 kg), SpO2 98 %, not currently breastfeeding. Vital signs are normal.  No fever. Output: Producing urine and producing stool. HEENT: Normal HEENT exam.    Lungs:  Normal effort and normal respiratory rate. Breath sounds clear to auscultation. She is not in respiratory distress. Heart: Normal rate. Extremities: Normal range of motion. There is no deformity. Neurological: Patient is alert and oriented to person, place and time. Patient has normal coordination. Pupils:  Pupils are equal, round, and reactive to light. Pupils are equal.   Skin:  Warm and dry. No rash or cyanosis. Gait is stable  No apparent deformity on lumbar spine inspection  Range of motion is preserved but associated with pain with forward flexion  Straight leg raise positive on right side    Assessment & Plan   Very pleasant 75-year-old female who was last seen in my clinic more than 1 year ago  She is presenting with recurrence of her chronic back pain issue  Pain located in the lower back area across midline with radiation down right leg  Associated symptoms include right leg numbness  Describes the pain as aching throbbing burning sensation  Pain aggravated with lifting bending twisting turning and routine daily work life  Pain is interfering with quality of life  She denies any loss of bladder or bowel control  No history of fever chills or weight loss    She has been doing home exercises  She has lost 100 pounds over last 1 year with the dietary changes  She has done multiple courses of physical therapy and continue to follow home exercise program    Had MRI lumbar spine that had shown L5 4-L5 level disc disease  She had epidural injection 1 year ago that provided her more than 50% relief with significant improvement in activity tolerance lasting for several months  Pain is now back to the baseline  Epidural injection have worked for her in the past and he is requesting repeat injection    1. Chronic bilateral low back pain with right-sided sciatica    2.  Bulge of lumbar disc without myelopathy        Orders Placed This Encounter   Procedures  RI NJX AA&/STRD TFRML EPI LUMBAR/SACRAL 1 LEVEL      No orders of the defined types were placed in this encounter.      I will schedule patient for a repeat lumbar epidural steroid injection  Patient weight loss effort applauded  Continue home exercise program    Electronically signed by Raul Egan MD on 8/17/2021 at 9:19 AM

## 2021-09-13 ENCOUNTER — HOSPITAL ENCOUNTER (OUTPATIENT)
Dept: PAIN MANAGEMENT | Facility: CLINIC | Age: 31
Discharge: HOME | End: 2021-09-13
Payer: COMMERCIAL

## 2021-09-13 VITALS
HEART RATE: 81 BPM | RESPIRATION RATE: 16 BRPM | OXYGEN SATURATION: 98 % | DIASTOLIC BLOOD PRESSURE: 68 MMHG | SYSTOLIC BLOOD PRESSURE: 113 MMHG | WEIGHT: 120 LBS | BODY MASS INDEX: 22.08 KG/M2 | HEIGHT: 62 IN | TEMPERATURE: 98.4 F

## 2021-09-13 VITALS
TEMPERATURE: 98.4 F | RESPIRATION RATE: 12 BRPM | SYSTOLIC BLOOD PRESSURE: 106 MMHG | DIASTOLIC BLOOD PRESSURE: 73 MMHG | HEART RATE: 59 BPM | OXYGEN SATURATION: 99 %

## 2021-09-13 DIAGNOSIS — R52 PAIN MANAGEMENT: ICD-10-CM

## 2021-09-13 LAB — HCG, PREGNANCY URINE (POC): NEGATIVE

## 2021-09-13 PROCEDURE — 64483 NJX AA&/STRD TFRM EPI L/S 1: CPT

## 2021-09-13 PROCEDURE — 2580000003 HC RX 258: Performed by: ANESTHESIOLOGY

## 2021-09-13 PROCEDURE — 2500000003 HC RX 250 WO HCPCS: Performed by: ANESTHESIOLOGY

## 2021-09-13 PROCEDURE — 6360000002 HC RX W HCPCS: Performed by: ANESTHESIOLOGY

## 2021-09-13 PROCEDURE — 99152 MOD SED SAME PHYS/QHP 5/>YRS: CPT | Performed by: ANESTHESIOLOGY

## 2021-09-13 PROCEDURE — 64484 NJX AA&/STRD TFRM EPI L/S EA: CPT | Performed by: ANESTHESIOLOGY

## 2021-09-13 PROCEDURE — 64483 NJX AA&/STRD TFRM EPI L/S 1: CPT | Performed by: ANESTHESIOLOGY

## 2021-09-13 PROCEDURE — 6360000004 HC RX CONTRAST MEDICATION: Performed by: ANESTHESIOLOGY

## 2021-09-13 PROCEDURE — 81025 URINE PREGNANCY TEST: CPT

## 2021-09-13 RX ORDER — SODIUM CHLORIDE 0.9 % (FLUSH) 0.9 %
SYRINGE (ML) INJECTION
Status: COMPLETED | OUTPATIENT
Start: 2021-09-13 | End: 2021-09-13

## 2021-09-13 RX ORDER — MIDAZOLAM HYDROCHLORIDE 2 MG/2ML
INJECTION, SOLUTION INTRAMUSCULAR; INTRAVENOUS
Status: COMPLETED | OUTPATIENT
Start: 2021-09-13 | End: 2021-09-13

## 2021-09-13 RX ORDER — LIDOCAINE HYDROCHLORIDE 10 MG/ML
INJECTION, SOLUTION EPIDURAL; INFILTRATION; INTRACAUDAL; PERINEURAL
Status: COMPLETED | OUTPATIENT
Start: 2021-09-13 | End: 2021-09-13

## 2021-09-13 RX ORDER — FENTANYL CITRATE 50 UG/ML
INJECTION, SOLUTION INTRAMUSCULAR; INTRAVENOUS
Status: COMPLETED | OUTPATIENT
Start: 2021-09-13 | End: 2021-09-13

## 2021-09-13 RX ORDER — DEXAMETHASONE SODIUM PHOSPHATE 10 MG/ML
INJECTION, SOLUTION INTRAMUSCULAR; INTRAVENOUS
Status: COMPLETED | OUTPATIENT
Start: 2021-09-13 | End: 2021-09-13

## 2021-09-13 RX ADMIN — MIDAZOLAM HYDROCHLORIDE 1 MG: 1 INJECTION, SOLUTION INTRAMUSCULAR; INTRAVENOUS at 08:36

## 2021-09-13 RX ADMIN — LIDOCAINE HYDROCHLORIDE 10 ML: 10 INJECTION, SOLUTION EPIDURAL; INFILTRATION; INTRACAUDAL at 08:37

## 2021-09-13 RX ADMIN — IOHEXOL 6 ML: 180 INJECTION INTRAVENOUS at 08:38

## 2021-09-13 RX ADMIN — DEXAMETHASONE SODIUM PHOSPHATE 10 MG: 10 INJECTION, SOLUTION INTRAMUSCULAR; INTRAVENOUS at 08:38

## 2021-09-13 RX ADMIN — Medication 3 ML: at 08:36

## 2021-09-13 RX ADMIN — FENTANYL CITRATE 50 MCG: 50 INJECTION INTRAMUSCULAR; INTRAVENOUS at 08:36

## 2021-09-13 ASSESSMENT — PAIN - FUNCTIONAL ASSESSMENT: PAIN_FUNCTIONAL_ASSESSMENT: 0-10

## 2021-09-13 ASSESSMENT — PAIN SCALES - GENERAL
PAINLEVEL_OUTOF10: 2
PAINLEVEL_OUTOF10: 2

## 2021-09-13 NOTE — H&P
UPDATE:  Office visit pain clinic in [facility] with all required elements of H&P dated 08/17/2021  Patient seen preop, chart reviewed, AAO x 3, in NAD, VSS,. No changes in medical history since last evaluation. Risk / Benefits explained to patient, patient agree to proceed with plan.   ASA 2  MP 2

## 2021-09-16 ENCOUNTER — HOSPITAL ENCOUNTER (OUTPATIENT)
Facility: MEDICAL CENTER | Age: 31
Discharge: HOME | End: 2021-09-16
Payer: COMMERCIAL

## 2021-09-16 ENCOUNTER — OFFICE VISIT (OUTPATIENT)
Dept: ONCOLOGY | Age: 31
End: 2021-09-16
Payer: COMMERCIAL

## 2021-09-16 ENCOUNTER — HOSPITAL ENCOUNTER (OUTPATIENT)
Facility: MEDICAL CENTER | Age: 31
End: 2021-09-16
Payer: COMMERCIAL

## 2021-09-16 ENCOUNTER — TELEPHONE (OUTPATIENT)
Dept: ONCOLOGY | Age: 31
End: 2021-09-16

## 2021-09-16 VITALS
TEMPERATURE: 98.1 F | HEART RATE: 80 BPM | WEIGHT: 123.2 LBS | DIASTOLIC BLOOD PRESSURE: 84 MMHG | BODY MASS INDEX: 22.53 KG/M2 | SYSTOLIC BLOOD PRESSURE: 119 MMHG

## 2021-09-16 DIAGNOSIS — D70.9 NEUTROPENIA, UNSPECIFIED TYPE (HCC): Primary | ICD-10-CM

## 2021-09-16 DIAGNOSIS — M54.41 CHRONIC BILATERAL LOW BACK PAIN WITH RIGHT-SIDED SCIATICA: Chronic | ICD-10-CM

## 2021-09-16 DIAGNOSIS — R74.01 ELEVATED TRANSAMINASE LEVEL: ICD-10-CM

## 2021-09-16 DIAGNOSIS — D70.9 NEUTROPENIA, UNSPECIFIED TYPE (HCC): ICD-10-CM

## 2021-09-16 DIAGNOSIS — G89.29 CHRONIC BILATERAL LOW BACK PAIN WITH RIGHT-SIDED SCIATICA: Chronic | ICD-10-CM

## 2021-09-16 DIAGNOSIS — M51.36 BULGE OF LUMBAR DISC WITHOUT MYELOPATHY: ICD-10-CM

## 2021-09-16 LAB
ABSOLUTE EOS #: 0.07 K/UL (ref 0–0.44)
ABSOLUTE IMMATURE GRANULOCYTE: 0.01 K/UL (ref 0–0.3)
ABSOLUTE LYMPH #: 1.15 K/UL (ref 1.1–3.7)
ABSOLUTE MONO #: 0.39 K/UL (ref 0.1–1.2)
ALBUMIN SERPL-MCNC: 4.5 G/DL (ref 3.5–5.2)
ALBUMIN/GLOBULIN RATIO: NORMAL (ref 1–2.5)
ALP BLD-CCNC: 78 U/L (ref 35–104)
ALT SERPL-CCNC: 28 U/L (ref 5–33)
AST SERPL-CCNC: 28 U/L
BASOPHILS # BLD: 1 % (ref 0–2)
BASOPHILS ABSOLUTE: <0.03 K/UL (ref 0–0.2)
BILIRUB SERPL-MCNC: 0.5 MG/DL (ref 0.3–1.2)
BILIRUBIN DIRECT: 0.13 MG/DL
BILIRUBIN, INDIRECT: 0.37 MG/DL (ref 0–1)
DIFFERENTIAL TYPE: ABNORMAL
EOSINOPHILS RELATIVE PERCENT: 2 % (ref 1–4)
GLOBULIN: NORMAL G/DL (ref 1.5–3.8)
HCT VFR BLD CALC: 39.9 % (ref 36.3–47.1)
HEMOGLOBIN: 13.3 G/DL (ref 11.9–15.1)
IMMATURE GRANULOCYTES: 0 %
LYMPHOCYTES # BLD: 34 % (ref 24–43)
MCH RBC QN AUTO: 32.1 PG (ref 25.2–33.5)
MCHC RBC AUTO-ENTMCNC: 33.3 G/DL (ref 28.4–34.8)
MCV RBC AUTO: 96.4 FL (ref 82.6–102.9)
MONOCYTES # BLD: 11 % (ref 3–12)
NRBC AUTOMATED: 0 PER 100 WBC
PDW BLD-RTO: 13 % (ref 11.8–14.4)
PLATELET # BLD: 183 K/UL (ref 138–453)
PLATELET ESTIMATE: ABNORMAL
PMV BLD AUTO: 9.7 FL (ref 8.1–13.5)
RBC # BLD: 4.14 M/UL (ref 3.95–5.11)
RBC # BLD: ABNORMAL 10*6/UL
SEG NEUTROPHILS: 52 % (ref 36–65)
SEGMENTED NEUTROPHILS ABSOLUTE COUNT: 1.77 K/UL (ref 1.5–8.1)
TOTAL PROTEIN: 7.3 G/DL (ref 6.4–8.3)
WBC # BLD: 3.4 K/UL (ref 3.5–11.3)
WBC # BLD: ABNORMAL 10*3/UL

## 2021-09-16 PROCEDURE — 99214 OFFICE O/P EST MOD 30 MIN: CPT | Performed by: INTERNAL MEDICINE

## 2021-09-16 PROCEDURE — 36415 COLL VENOUS BLD VENIPUNCTURE: CPT

## 2021-09-16 PROCEDURE — 1036F TOBACCO NON-USER: CPT | Performed by: INTERNAL MEDICINE

## 2021-09-16 PROCEDURE — 99211 OFF/OP EST MAY X REQ PHY/QHP: CPT | Performed by: INTERNAL MEDICINE

## 2021-09-16 PROCEDURE — G8427 DOCREV CUR MEDS BY ELIG CLIN: HCPCS | Performed by: INTERNAL MEDICINE

## 2021-09-16 PROCEDURE — 85025 COMPLETE CBC W/AUTO DIFF WBC: CPT

## 2021-09-16 PROCEDURE — G8420 CALC BMI NORM PARAMETERS: HCPCS | Performed by: INTERNAL MEDICINE

## 2021-09-16 PROCEDURE — 80076 HEPATIC FUNCTION PANEL: CPT

## 2021-09-16 RX ORDER — GABAPENTIN 400 MG/1
CAPSULE ORAL
Qty: 90 CAPSULE | Refills: 0 | Status: SHIPPED | OUTPATIENT
Start: 2021-09-16 | End: 2021-12-15

## 2021-09-16 RX ORDER — BACLOFEN 10 MG/1
10 TABLET ORAL 2 TIMES DAILY
Qty: 60 TABLET | Refills: 0 | Status: SHIPPED | OUTPATIENT
Start: 2021-09-16 | End: 2021-12-15

## 2021-09-16 NOTE — PATIENT INSTRUCTIONS
Magnesium and ramon daily from over the couter  RV 6 months with CBc and liver function test before RV

## 2021-09-18 NOTE — PROGRESS NOTES
_        Chief Complaint   Patient presents with    Follow-up    Discuss Labs    Mouth Lesions     DIAGNOSIS:        Leukopenia/neutropenia  Elevated transaminases  Weight loss. Early satiety. CURRENT THERAPY:         Close monitoring and observation for the above. Minerals and vitamin supplements. BRIEF CASE HISTORY:      Ms. Ayla Dumont is a very pleasant 27 y.o. female with history of multiple co morbidities as listed. Patient had follow-up with gastroenterology for multiple GI symptoms and was diagnosed with irritable bowel syndrome. She had recent problem with increasing abdominal discomfort and early satiety and increasing problem with weight loss. She had previous GI work-up and EGD with records not available to me. As per the patient no significant findings were seen. The patient had recent blood work which showed evidence of neutropenia. She is referred for further evaluation. She has no lymphadenopathy. Patient states that she was not feeling well at the time of that testing. She had excessive weakness and fatigue and feeling tired. No documented fever but she felt warm. She has generalized body aches. She had no appetite. She had no vomiting. No chest pain or shortness of breath. No headaches. Patient denies any history of repeated infections. No recent treatments. Patient had no history of smoking or alcohol drinking. Patient sister and father had history of hepatitis. INTERIM HISTORY:   The patient seen for follow-up neutropenia. Clinically stable. No fever. No infections. No enlarged lymph nodes. No episodes of use of breath. No chest pain or shortness of breath.  Patient complains of retrosternal soreness of the mouth          PAST MEDICAL HISTORY: has a past medical history of Chronic back pain, Colon polyps, IBS (irritable bowel syndrome), Neutropenia ([address] and Osteoarthritis. PAST SURGICAL HISTORY: has a past surgical history that includes [place] tooth extraction (Bilateral, 06/2018); Nerve Block (01/23/2019); Nerve Block (Right, 02/06/2019); Colonoscopy; other surgical history (Right, 07/29/2019); Anesthesia Nerve Block (Right, 7/29/2019); Nerve Block (02/17/2020); and Pain management procedure (Bilateral, 2/17/2020). CURRENT MEDICATIONS:  has a current medication list which includes the following prescription(s): gabapentin, baclofen, duloxetine, linzess, polyethylene glycol 3350, melatonin, and triamcinolone. ALLERGIES:  is allergic to latex. FAMILY HISTORY: Father and sister had history of hepatitis. Otherwise negative for any hematological or oncological conditions. SOCIAL HISTORY:  reports that she has never smoked. She has never used smokeless tobacco. She reports current alcohol use. She reports that she does not use drugs. REVIEW OF SYSTEMS:     · General: Positive for weakness and fatigue. Positive for weight loss and decreased appetite. No fever or chills. · Eyes: No blurred vision, eye pain or double vision. · Ears: No hearing problems or drainage. No tinnitus. · Throat: No sore throat, problems with swallowing or dysphagia. · Respiratory: No cough, sputum or hemoptysis. No shortness of breath. No pleuritic chest pain. · Cardiovascular: No chest pain, orthopnea or PND. No lower extremity edema. No palpitation. · Gastrointestinal: As above. · Genitourinary: No dysuria, hematuria, frequency or urgency. · Musculoskeletal: No muscle aches or pains. No limitation of movement. No back pain. No gait disturbance, No joint complaints. · Dermatologic: No skin rashes or pruritus. No skin lesions or discolorations. · Psychiatric: No depression, anxiety, or stress or signs of schizophrenia. No change in mood or affect. · Hematologic: No history of bleeding tendency. No bruises or ecchymosis.  No history of clotting problems. · Infectious disease: No fever, chills or frequent infections. · Endocrine: No polydipsia or polyuria. No temperature intolerance. · Neurologic: No headaches or dizziness. No weakness or numbness of the extremities. No changes in balance, coordination,  memory, mentation, behavior. · Allergic/Immunologic: No nasal congestion or hives. No repeated infections. PHYSICAL EXAM:  The patient is not in acute distress. Vital signs: Blood pressure 119/84, pulse 80, temperature 98.1 °F (36.7 °C), temperature source Temporal, weight 123 lb 3.2 oz (55.9 kg), not currently breastfeeding. General appearance - well appearing, not in pain or distress  Mental status - good mood, alert and oriented  Eyes - pupils equal and reactive, extraocular eye movements intact  Ears - bilateral TM's and external ear canals normal  Nose - normal and patent, no erythema, discharge or polyps  Mouth - mucous membranes moist, pharynx normal without lesions  Neck - supple, no significant adenopathy  Lymphatics - no palpable lymphadenopathy, no hepatosplenomegaly  Chest - clear to auscultation, no wheezes, rales or rhonchi, symmetric air entry  Heart - normal rate, regular rhythm, normal S1, S2, no murmurs, rubs, clicks or gallops  Abdomen -mild epigastric discomfort.   Soft, nondistended, no masses or organomegaly  Neurological - alert, oriented, normal speech, no focal findings or movement disorder noted  Musculoskeletal - no joint tenderness, deformity or swelling  Extremities - peripheral pulses normal, no pedal edema, no clubbing or cyanosis  Skin - normal coloration and turgor, no rashes, no suspicious skin lesions noted     Review of Diagnostic data:   Lab Results   Component Value Date    WBC 3.4 (L) 09/16/2021    HGB 13.3 09/16/2021    HCT 39.9 09/16/2021    MCV 96.4 09/16/2021     09/16/2021       Chemistry        Component Value Date/Time     07/08/2020 1119    K 3.9 07/08/2020 1119     07/08/2020 1119    CO2 29 07/08/2020 1119    BUN 6 07/08/2020 1119    CREATININE 0.41 (L) 07/08/2020 1119        Component Value Date/Time    CALCIUM 9.2 07/08/2020 1119    ALKPHOS 78 09/16/2021 0923    AST 28 09/16/2021 0923    ALT 28 09/16/2021 0923    BILITOT 0.50 09/16/2021 0923            IMPRESSION:   Leukopenia/neutropenia  Elevated transaminases  Weight loss. Early satiety. PLAN: I reviewed the labs available to me and discussed with the patient. I explained to the patient the nature of this hematologic problem. I explained the significance of these abnormalities in layman language. Previous lab tests showed normal white blood cells. Recently had lab test showing neutropenia. Patient had multiple other symptoms mainly suggestive of some GI abnormalities but concerned about possible infectious cause with possible viral etiology. Patient will continue have follow-up with her gastroenterologist for further management. I explained to the patient possible underlying cause for this leukopenia. She has normal hemoglobin and platelets. Considering her age this leukopenia could be related to immune mediated etiology or recent viral infection or possible liver disease. Concerned about possible underlying hepatitis with patient's labs showing elevated transaminases and family history of hepatitis with her father and sister. Repeated labs showed no changes. Continued observation is recommended. With patient's symptoms of sores in the tongue and mouth I recommended for the patient using a magnesium. We will see her in 6 months with repeated labs. Sooner for any problems. Patient's questions were answered to the best of her satisfaction and she verbalized full understanding and agreement.

## 2021-09-28 ENCOUNTER — OFFICE VISIT (OUTPATIENT)
Dept: PAIN MANAGEMENT | Age: 31
End: 2021-09-28
Payer: COMMERCIAL

## 2021-09-28 VITALS
BODY MASS INDEX: 22.82 KG/M2 | SYSTOLIC BLOOD PRESSURE: 96 MMHG | HEIGHT: 62 IN | WEIGHT: 124 LBS | DIASTOLIC BLOOD PRESSURE: 65 MMHG | HEART RATE: 66 BPM | OXYGEN SATURATION: 99 %

## 2021-09-28 DIAGNOSIS — M54.41 CHRONIC BILATERAL LOW BACK PAIN WITH RIGHT-SIDED SCIATICA: Primary | Chronic | ICD-10-CM

## 2021-09-28 DIAGNOSIS — G89.29 CHRONIC BILATERAL LOW BACK PAIN WITH RIGHT-SIDED SCIATICA: Primary | Chronic | ICD-10-CM

## 2021-09-28 DIAGNOSIS — M51.36 BULGE OF LUMBAR DISC WITHOUT MYELOPATHY: ICD-10-CM

## 2021-09-28 DIAGNOSIS — Z92.241 S/P EPIDURAL STEROID INJECTION: ICD-10-CM

## 2021-09-28 PROBLEM — E66.09 CLASS 2 OBESITY DUE TO EXCESS CALORIES WITHOUT SERIOUS COMORBIDITY WITH BODY MASS INDEX (BMI) OF 35.0 TO 35.9 IN ADULT: Status: RESOLVED | Noted: 2018-06-26 | Resolved: 2021-09-28

## 2021-09-28 PROCEDURE — G8420 CALC BMI NORM PARAMETERS: HCPCS | Performed by: ANESTHESIOLOGY

## 2021-09-28 PROCEDURE — 1036F TOBACCO NON-USER: CPT | Performed by: ANESTHESIOLOGY

## 2021-09-28 PROCEDURE — G8427 DOCREV CUR MEDS BY ELIG CLIN: HCPCS | Performed by: ANESTHESIOLOGY

## 2021-09-28 PROCEDURE — 99213 OFFICE O/P EST LOW 20 MIN: CPT | Performed by: ANESTHESIOLOGY

## 2021-09-28 ASSESSMENT — ENCOUNTER SYMPTOMS: RESPIRATORY NEGATIVE: 1

## 2021-09-28 NOTE — PROGRESS NOTES
The patient is a 27 y. o. Non- / non  female. Chief Complaint   Patient presents with    Back Pain    Follow-up        HPI    49-year-old female who was seen for recurrence of back and right lower extremity pain  Failed conservative measures  Clinical presentation suggested left lumbar radiculitis  We did a lumbar epidural steroid injection  Today for postprocedure follow-up  Reported near complete resolution of pain  Very happy and satisfied  No side effects  Able to tolerate significant improvement in activity    Outcome   Any improvement of activity?   Yes   Any side effects (appetite,leg cramping,facial fleshing):no   Increase of pain:  No  Pain score Today:  1  % of pain relief:95%  Pain diary (medial branch block): No    Lab Results   Component Value Date    LABA1C 4.5 07/08/2020     Lab Results   Component Value Date    EAG 82 07/08/2020           Past Medical History:   Diagnosis Date    Chronic back pain     Colon polyps     follows with Dr. Mickey Jerez for colonoscopy every 5 years    IBS (irritable bowel syndrome)     Neutropenia ([address]     Dr. Kristine Bond following    Osteoarthritis         Past Surgical History:   Procedure Laterality Date    ANESTHESIA NERVE BLOCK Right 7/29/2019    TRANSFORAMINAL LUMBAR EPIDURAL STEROID INJECTION AT L4 L5 RIGHT performed by Rula Myrick MD at [address] [place] [address]  01/23/2019    lumbar epidural right, decadron 10    NERVE BLOCK Right 02/06/2019    right lumbar epidural- decadron 10 mg    NERVE BLOCK  02/17/2020    Procedures:  Epidural Steroid Injection Bilateral L5s1    OTHER SURGICAL HISTORY Right 07/29/2019    TRANSFORAMINAL LUMBAR EPIDURAL STEROID INJECTION AT L4 L5 RIGHT (Right )    PAIN MANAGEMENT PROCEDURE Bilateral 2/17/2020    EPIDURAL STEROID INJECTION BILATERAL L5S1 performed by [facility]ula [facility]rick [facility] at [facility] Bilateral 06/2018       Social History     Socioeconomic History    Marital status: Legally      Spouse name: None    Number of children: None    Years of education: None    Highest education level: None   Occupational History    None   Tobacco Use    Smoking status: Never Smoker    Smokeless tobacco: Never Used   Vaping Use    Vaping Use: Some days    Substances: Always   Substance and Sexual Activity    Alcohol use: Yes     Comment: socially    Drug use: No    Sexual activity: Yes   Other Topics Concern    None   Social History Narrative    None     Social Determinants of Health     Financial Resource Strain:     Difficulty of Paying Living Expenses:    Food Insecurity:     Worried About Running Out of Food in the Last Year:     Ran Out of Food in the Last Year:    Transportation Needs:     Lack of Transportation (Medical):      Lack of Transportation (Non-Medical):    Physical Activity:     Days of Exercise per Week:     Minutes of Exercise per Session:    Stress:     Feeling of Stress :    Social Connections:     Frequency of Communication with Friends and Family:     Frequency of Social Gatherings with Friends and Family:     Attends Hoahaoism Services:     Active Member of Clubs or Organizations:     Attends Club or Organization Meetings:     Marital Status:    Intimate Partner Violence:     Fear of Current or Ex-Partner:     Emotionally Abused:     Physically Abused:     Sexually Abused:        Family History   Problem Relation Age of Onset    Heart Defect Mother         murmur    Diabetes Father     Heart Defect Father         hepatitis c    Heart Defect Sister         neutropenia, hepatitis c    Cancer Maternal Grandfather         prostate    No Known Problems Other        Allergies   Allergen Reactions    Latex Hives       Vitals:    09/28/21 1047   BP: 96/65   Pulse: 66   SpO2: 99%       Current Outpatient Medications   Medication Sig Dispense Refill    gabapentin (NEURONTIN) 400 MG capsule TAKE 1 CAPSULE BY MOUTH THREE TIMES A DAY 90 capsule 0    baclofen (LIORESAL) 10 MG tablet Take 1 tablet by mouth 2 times daily 60 tablet 0    DULoxetine (CYMBALTA) 30 MG extended release capsule Take 1 capsule by mouth daily 30 capsule 3    LINZESS 290 MCG CAPS capsule TAKE 1 CAPSULE BY MOUTH ONE TIME A DAY      Polyethylene Glycol 3350 (MIRALAX PO) Take by mouth 2 times daily       melatonin 3 MG TABS tablet Take 5 mg by mouth nightly as needed      triamcinolone (KENALOG) 0.1 % ointment Apply to rash twice daily (not face, armpit or groin) 80 g 2     No current facility-administered medications for this visit. Review of Systems   Constitutional: Negative. Negative for fever. HENT: Negative. Respiratory: Negative. Neurological: Negative. Objective:  General Appearance:  Well-appearing and in no acute distress. Vital signs: (most recent): Blood pressure 96/65, pulse 66, height 5' 2\" (1.575 m), weight 124 lb (56.2 kg), SpO2 99 %, not currently breastfeeding. Vital signs are normal.  No fever. Output: Producing urine and producing stool. HEENT: Normal HEENT exam.    Lungs:  Normal effort and normal respiratory rate. Breath sounds clear to auscultation. She is not in respiratory distress. Heart: Normal rate. Extremities: Normal range of motion. There is no deformity. Neurological: Patient is alert and oriented to person, place and time. Patient has normal coordination. Pupils:  Pupils are equal, round, and reactive to light. Pupils are equal.   Skin:  Warm and dry. No rash or cyanosis. Assessment & Plan     Near complete resolution of her back and right lower extremity pain after epidural injection  No side effect  No further intervention indicated at this time  Follow-up on as needed basis    Continue home exercises  I applauded her weight management efforts  I think that is also contributed for significant improvement in her pain  1. Chronic bilateral low back pain with right-sided sciatica    2. Bulge of lumbar disc without myelopathy    3.  S/P epidural steroid injection            Electronically signed by Francesca Monzon MD on 9/28/2021 at 11:07 AM

## 2021-10-26 ENCOUNTER — HOSPITAL ENCOUNTER (EMERGENCY)
Age: 31
Discharge: HOME | End: 2021-10-26
Attending: EMERGENCY MEDICINE
Payer: COMMERCIAL

## 2021-10-26 ENCOUNTER — APPOINTMENT (OUTPATIENT)
Dept: GENERAL RADIOLOGY | Age: 31
End: 2021-10-26
Payer: COMMERCIAL

## 2021-10-26 VITALS
SYSTOLIC BLOOD PRESSURE: 110 MMHG | WEIGHT: 125 LBS | TEMPERATURE: 97.2 F | HEART RATE: 65 BPM | OXYGEN SATURATION: 97 % | HEIGHT: 62 IN | BODY MASS INDEX: 23 KG/M2 | RESPIRATION RATE: 20 BRPM | DIASTOLIC BLOOD PRESSURE: 70 MMHG

## 2021-10-26 DIAGNOSIS — R07.89 CHEST WALL PAIN: Primary | ICD-10-CM

## 2021-10-26 DIAGNOSIS — M94.0 COSTOCHONDRITIS: ICD-10-CM

## 2021-10-26 PROCEDURE — 6370000000 HC RX 637 (ALT 250 FOR IP): Performed by: STUDENT IN AN ORGANIZED HEALTH CARE EDUCATION/TRAINING PROGRAM

## 2021-10-26 PROCEDURE — 71045 X-RAY EXAM CHEST 1 VIEW: CPT

## 2021-10-26 PROCEDURE — 93005 ELECTROCARDIOGRAM TRACING: CPT | Performed by: STUDENT IN AN ORGANIZED HEALTH CARE EDUCATION/TRAINING PROGRAM

## 2021-10-26 PROCEDURE — 99285 EMERGENCY DEPT VISIT HI MDM: CPT

## 2021-10-26 RX ORDER — IBUPROFEN 400 MG/1
400 TABLET ORAL ONCE
Status: COMPLETED | OUTPATIENT
Start: 2021-10-26 | End: 2021-10-26

## 2021-10-26 RX ORDER — ACETAMINOPHEN 500 MG
1000 TABLET ORAL ONCE
Status: COMPLETED | OUTPATIENT
Start: 2021-10-26 | End: 2021-10-26

## 2021-10-26 RX ORDER — IBUPROFEN 800 MG/1
800 TABLET ORAL 3 TIMES DAILY
Qty: 30 TABLET | Refills: 0 | Status: SHIPPED | OUTPATIENT
Start: 2021-10-26 | End: 2021-11-05

## 2021-10-26 RX ORDER — ACETAMINOPHEN 500 MG
1000 TABLET ORAL 3 TIMES DAILY
Qty: 30 TABLET | Refills: 0 | Status: SHIPPED | OUTPATIENT
Start: 2021-10-26

## 2021-10-26 RX ADMIN — ACETAMINOPHEN 1000 MG: 500 TABLET ORAL at 10:41

## 2021-10-26 RX ADMIN — IBUPROFEN 400 MG: 400 TABLET, FILM COATED ORAL at 10:41

## 2021-10-26 ASSESSMENT — PAIN DESCRIPTION - DESCRIPTORS: DESCRIPTORS: SHARP;STABBING

## 2021-10-26 ASSESSMENT — PAIN DESCRIPTION - FREQUENCY: FREQUENCY: INTERMITTENT

## 2021-10-26 ASSESSMENT — ENCOUNTER SYMPTOMS
SORE THROAT: 0
EYE PAIN: 0
NAUSEA: 0
ABDOMINAL PAIN: 0
VOMITING: 0
DIARRHEA: 0
SHORTNESS OF BREATH: 1
SINUS PAIN: 0

## 2021-10-26 ASSESSMENT — PAIN DESCRIPTION - LOCATION: LOCATION: CHEST;ARM

## 2021-10-26 ASSESSMENT — PAIN DESCRIPTION - ONSET: ONSET: ON-GOING

## 2021-10-26 ASSESSMENT — PAIN DESCRIPTION - ORIENTATION: ORIENTATION: LEFT

## 2021-10-26 ASSESSMENT — PAIN DESCRIPTION - PAIN TYPE: TYPE: ACUTE PAIN

## 2021-10-26 ASSESSMENT — PAIN SCALES - GENERAL
PAINLEVEL_OUTOF10: 9
PAINLEVEL_OUTOF10: 9

## 2021-10-26 ASSESSMENT — PAIN DESCRIPTION - PROGRESSION: CLINICAL_PROGRESSION: GRADUALLY WORSENING

## 2021-10-26 NOTE — ED PROVIDER NOTES
[facility] [address] [facility]     Emergency Department     Faculty Attestation        I performed a history and physical examination of the patient and discussed management with the resident. I reviewed the residents note and agree with the documented findings and plan of care. Any areas of disagreement are noted on the chart. I was personally present for the key portions of any procedures. I have documented in the chart those procedures where I was not present during the key portions. I have reviewed the emergency nurses triage note. I agree with the chief complaint, past medical history, past surgical history, allergies, medications, social and family history as documented unless otherwise noted below. For mid-level providers such as nurse practitioners as well as physicians assistants:    I have personally seen and evaluated the patient. I find the patient's history and physical exam are consistent with NP/PA documentation. I agree with the care provided, treatment rendered, disposition, & follow-up plan. Additional findings are as noted. Vital Signs: /70   Pulse 65   Temp 97.2 °F (36.2 °C) (Oral)   Resp 20   Ht 5' 2\" (1.575 m)   Wt 125 lb (56.7 kg)   LMP 10/25/2021   SpO2 97%   BMI 22.86 kg/m²   PCP:  Mirian DOMINIQUE PA-C    Pertinent Comments:     Left-sided chest pain. Patient states she was lifting a heavy bucket of paint when she developed this pain. Sharp and over her left lateral rib cage is worse with movement and certain movements of her left upper extremity. There is no substernal chest pain. She denies any fevers or chills. No cough or sputum production. Is not pleuritic. Exam she is afebrile nontoxic her left lateral chest wall is tender to palpation and this is reproducible. There is no bruising ecchymosis or deformity to the chest wall. Will check EKG, chest x-ray, pain control.       Critical Care  None          Sheldon H MD Reyna    Attending Emergency Medicine Physician              Yani Parker MD  10/26/21 7070

## 2021-10-26 NOTE — ED PROVIDER NOTES
[address] [facility]  ED  Emergency Department Encounter  EmergencyMedicineResident     This patient was seen during the COVID-19 crisis. There were limited resources and those resources we did have had to be conserved for the sickest of patients. Pt Name: Elmer Mcbride  MRN: [id number]  [place] 1990  Date of evaluation: 10/26/21  PCP: Laura Arthur PA-C    CHIEF COMPLAINT       Chief Complaint   Patient presents with    Chest Pain    Arm Pain     states she had a recent injury with her left arm at work x 1 week, heard a pop last night and pain worsened, radiating to her chest    Shortness of Breath     pain is exacerbated when taking deep breaths       HISTORY OF PRESENT ILLNESS  (Location/Symptom, Timing/Onset, Context/Setting, Quality, Duration, Modifying Factors, Severity.)      Elmer Mcbride is a 27 y.o. female who presents valuation of left chest wall pain and left shoulder pain. Patient states she was lifting pain at work when she felt a pop in her left armpit region. Patient has since been experiencing extreme pain in her left anterior chest wall. She reports decreased range of motion left shoulder. Denies any direct fall or trauma to left shoulder. She reports that she is having some difficulty taking deep breaths secondary to pain. She vapes but does not have any other significant risk factors for respiratory disease. PAST MEDICAL / SURGICAL /SOCIAL / FAMILY HISTORY      has a past medical history of Chronic back pain, Colon polyps, IBS (irritable bowel syndrome), Neutropenia ([facility] [address] and Osteoarthritis. has a past surgical history that includes [place] tooth extraction (Bilateral, 06/2018); Nerve Block (01/23/2019); Nerve Block (Right, 02/06/2019); Colonoscopy; other surgical history (Right, 07/29/2019); Anesthesia Nerve Block (Right, 7/29/2019); Nerve Block (02/17/2020); and Pain management procedure (Bilateral, 2/17/2020).       Social History Take 1 tablet by mouth 3 times daily for 10 days 10/26/21 11/5/21 Yes Grace Gardner MD   acetaminophen (TYLENOL) 500 MG tablet Take 2 tablets by mouth 3 times daily 10/26/21  Yes Grace Gardner MD   gabapentin (NEURONTIN) 400 MG capsule TAKE 1 CAPSULE BY MOUTH THREE TIMES A DAY  9/16/21 10/16/21  Alethea ORTIZ Forsandy PA-C   baclofen (LIORESAL) 10 MG tablet Take 1 tablet by mouth 2 times daily 9/16/21   Alethea ORTIZ ForVINICIUS delgado-C   DULoxetine (CYMBALTA) 30 MG extended release capsule Take 1 capsule by mouth daily 7/15/21   Alethea ORTIZ Forsandy PA-C   LINZESS 290 MCG CAPS capsule TAKE 1 CAPSULE BY MOUTH ONE TIME A DAY 11/20/20   Historical Provider, MD   Polyethylene Glycol 3350 (MIRALAX PO) Take by mouth 2 times daily     Historical Provider, MD   melatonin 3 MG TABS tablet Take 5 mg by mouth nightly as needed    Historical Provider, MD   triamcinolone (KENALOG) 0.1 % ointment Apply to rash twice daily (not face, armpit or groin) 7/9/19   Luther Hernandez MD       REVIEW OF SYSTEMS    (2-9 systems for level 4, 10 or more forlevel 5)      Review of Systems   Constitutional: Negative for activity change, chills and fever. HENT: Negative for congestion, sinus pain and sore throat. Eyes: Negative for pain and visual disturbance. Respiratory: Positive for shortness of breath. Cardiovascular: Positive for chest pain. Gastrointestinal: Negative for abdominal pain, diarrhea, nausea and vomiting. Genitourinary: Negative for difficulty urinating, dysuria and hematuria. Musculoskeletal:        Injury, left shoulder pain   Skin: Negative for rash and wound. Neurological: Negative for dizziness, light-headedness and headaches. Psychiatric/Behavioral: Negative for agitation and confusion.        PHYSICAL EXAM   (up to 7 for level 4, 8 or more forlevel 5)      ED TRIAGE VITALS BP: 110/70, Temp: 97.2 °F (36.2 °C), Pulse: 65, Resp: 20, SpO2: 97 %    Vitals:    10/26/21 0916   BP: 110/70   Pulse: 65   Resp: 20   Temp: 97.2 °F (36.2 °C)   TempSrc: Oral   SpO2: 97%   Weight: 125 lb (56.7 kg)   Height: 5' 2\" (1.575 m)         Physical Exam  Vitals and nursing note reviewed. Constitutional:       Appearance: Normal appearance. HENT:      Head: Normocephalic and atraumatic. Nose: Nose normal.      Mouth/Throat:      Mouth: Mucous membranes are moist.   Eyes:      Extraocular Movements: Extraocular movements intact. Pupils: Pupils are equal, round, and reactive to light. Cardiovascular:      Rate and Rhythm: Normal rate and regular rhythm. Pulses: Normal pulses. Heart sounds: Normal heart sounds. Pulmonary:      Effort: Pulmonary effort is normal.      Breath sounds: Normal breath sounds. Chest:      Chest wall: Tenderness present. Abdominal:      General: Abdomen is flat. Palpations: Abdomen is soft. Musculoskeletal:      Cervical back: Normal range of motion. Comments: Limited abduction on physical exam, tenderness to palpation left axilla primarily over the chest wall   Skin:     General: Skin is warm and dry. Capillary Refill: Capillary refill takes less than 2 seconds. Neurological:      General: No focal deficit present. Mental Status: She is alert and oriented to person, place, and time.    Psychiatric:         Mood and Affect: Mood normal.         Behavior: Behavior normal.           DIFFERENTIAL  DIAGNOSIS     PLAN (LABS / IMAGING / EKG):  Orders Placed This Encounter   Procedures    XR CHEST PORTABLE    EKG 12 Lead       MEDICATIONS ORDERED:  ED Medication Orders (From admission, onward)    Start Ordered     Status Ordering Provider    10/26/21 1030 10/26/21 1024  ibuprofen (ADVIL;MOTRIN) tablet 400 mg  ONCE      Last MAR action: Given - by Bronwyn De Luna on 10/26/21 at [facility]    10/26/21 1030 10/26/21 1024  acetaminophen (TYLENOL) tablet 1,000 mg  ONCE      Last MAR action: Given - by Bronwyn De Luna on 10/26/21 at 1041 [facility]          DDX: Costochondritis, pleurisy, rotator cuff tear, rib fracture, rib dislocation    DIAGNOSTIC RESULTS / EMERGENCY DEPARTMENT COURSE / MDM     IMPRESSION & INITIAL PLAN:  80-year-old female present emerged from today for evaluation of left anterior chest wall pain and left axilla pain. States she was carrying pain at work when she felt a pop. Patient has had increased pain with deep respiration. She vapes but otherwise has no significant risk factors. Plan for x-ray of the chest wall and the left shoulder with multimodal pain control. Chest x-ray negative. Emergency department. Patient notes moderate pain improvement with Tylenol and ibuprofen. Plan to discharge home with NSAID regimen. Patient agreeable to plan of care. LABS:  No results found for this visit on 10/26/21. RADIOLOGY:  XR CHEST PORTABLE   Final Result   Unremarkable chest.             CONSULTS:  None    CRITICAL CARE:  See attending physician note    FINAL IMPRESSION      1. Chest wall pain    2. Costochondritis          DISPOSITION / PLAN     DISPOSITION Decision To Discharge 10/26/2021 11:51:31 AM      PATIENT REFERRED TO:  No follow-up provider specified.     DISCHARGE MEDICATIONS:  Discharge Medication List as of 10/26/2021 11:53 AM      START taking these medications    Details   ibuprofen (ADVIL;MOTRIN) 800 MG tablet Take 1 tablet by mouth 3 times daily for 10 days, Disp-30 tablet, R-0Print      acetaminophen (TYLENOL) 500 MG tablet Take 2 tablets by mouth 3 times daily, Disp-30 tablet, R-0Print           Discharge Medication List as of 10/26/2021 11:53 AM           Linnette Huynh MD  Emergency Medicine Resident    (Please note that portions of this note were completed with a voice recognition program.  Efforts were made to edit the dictations but occasionally words are mis-transcribed.)       Linnette Huynh MD  Resident  10/26/21 7040

## 2021-10-27 LAB
EKG ATRIAL RATE: 58 BPM
EKG P AXIS: 43 DEGREES
EKG P-R INTERVAL: 144 MS
EKG Q-T INTERVAL: 424 MS
EKG QRS DURATION: 80 MS
EKG QTC CALCULATION (BAZETT): 416 MS
EKG R AXIS: 47 DEGREES
EKG T AXIS: 42 DEGREES
EKG VENTRICULAR RATE: 58 BPM

## 2021-10-27 PROCEDURE — 93010 ELECTROCARDIOGRAM REPORT: CPT | Performed by: INTERNAL MEDICINE

## 2022-02-22 DIAGNOSIS — M54.41 CHRONIC BILATERAL LOW BACK PAIN WITH RIGHT-SIDED SCIATICA: Chronic | ICD-10-CM

## 2022-02-22 DIAGNOSIS — G89.29 CHRONIC BILATERAL LOW BACK PAIN WITH RIGHT-SIDED SCIATICA: Chronic | ICD-10-CM

## 2022-02-22 DIAGNOSIS — F41.9 ANXIETY: ICD-10-CM

## 2022-02-22 RX ORDER — DULOXETIN HYDROCHLORIDE 30 MG/1
CAPSULE, DELAYED RELEASE ORAL
Qty: 30 CAPSULE | Refills: 0 | Status: SHIPPED | OUTPATIENT
Start: 2022-02-22

## 2022-03-08 ENCOUNTER — HOSPITAL ENCOUNTER (OUTPATIENT)
Facility: MEDICAL CENTER | Age: 32
End: 2022-03-08

## 2022-04-14 ENCOUNTER — HOSPITAL ENCOUNTER (OUTPATIENT)
Facility: MEDICAL CENTER | Age: 32
End: 2022-04-14

## 2023-07-19 ENCOUNTER — HOSPITAL ENCOUNTER (EMERGENCY)
Age: 33
Discharge: HOME | End: 2023-07-19
Attending: EMERGENCY MEDICINE
Payer: COMMERCIAL

## 2023-07-19 VITALS
WEIGHT: 127.43 LBS | HEIGHT: 61 IN | RESPIRATION RATE: 16 BRPM | HEART RATE: 68 BPM | DIASTOLIC BLOOD PRESSURE: 73 MMHG | SYSTOLIC BLOOD PRESSURE: 124 MMHG | TEMPERATURE: 97.9 F | BODY MASS INDEX: 24.06 KG/M2 | OXYGEN SATURATION: 98 %

## 2023-07-19 DIAGNOSIS — R20.2 PARESTHESIA: Primary | ICD-10-CM

## 2023-07-19 PROCEDURE — 93931 UPPER EXTREMITY STUDY: CPT

## 2023-07-19 PROCEDURE — 99284 EMERGENCY DEPT VISIT MOD MDM: CPT

## 2023-07-19 PROCEDURE — 93971 EXTREMITY STUDY: CPT

## 2023-07-19 ASSESSMENT — PAIN DESCRIPTION - LOCATION: LOCATION: ARM

## 2023-07-19 ASSESSMENT — PAIN DESCRIPTION - ORIENTATION: ORIENTATION: RIGHT

## 2023-07-19 ASSESSMENT — PAIN - FUNCTIONAL ASSESSMENT: PAIN_FUNCTIONAL_ASSESSMENT: 0-10

## 2023-07-19 ASSESSMENT — PAIN DESCRIPTION - DESCRIPTORS: DESCRIPTORS: NUMBNESS;STABBING

## 2023-07-19 ASSESSMENT — PAIN SCALES - GENERAL: PAINLEVEL_OUTOF10: 0

## 2023-07-19 NOTE — ED NOTES
Pt. Returned from vascular lab. Pt states that she is still in a lot of pain at this time. Pt given warm blankets for comfort. Pt a&ox4 and answering all questions appropriately. Will continue with plan of care.      Lilli De Leon RN  07/19/23 1958

## 2023-07-20 NOTE — ED NOTES
Pt. Discharge instructions reviewed. Pt has no further questions at this time.       Kaycee Tavares RN  07/19/23 4046

## 2023-07-20 NOTE — DISCHARGE INSTRUCTIONS
Thank you for coming to [facility] [place] emergency department! You were seen today for arm pain, your ultrasound was normal, this could be Raynaud's. Return immediately if this gets worse. Follow up with your primary care doctor. If you have any worsening of symptoms or any other concerns, please return to the emergency department. We are always available!

## 2023-08-01 ENCOUNTER — TELEPHONE (OUTPATIENT)
Dept: ADMINISTRATIVE | Age: 33
End: 2023-08-01

## 2023-08-01 NOTE — TELEPHONE ENCOUNTER
Patient would like to make an appt with Dr. Pura Lundborg, she states she is having right arm pain, she normally sees Dr. Pura Lundborg for back pain, but would like an appt for right arm pain, for a month last visit was 09/25/2021, unable to reach office please call pt [phone] [facility]/sc Patient is a 52y old  Male who presents with a chief complaint of Unresponsive (27 Dec 2022 08:52)       INTERVAL HPI/OVERNIGHT EVENTS:  Patient seen and evaluated at bedside.  Pt is resting comfortable in NAD. Denies N/V/F/C.  Pain rated at X/10    Allergies    No Known Allergies    Intolerances        Vital Signs Last 24 Hrs  T(C): 36.6 (27 Dec 2022 04:58), Max: 36.8 (26 Dec 2022 18:24)  T(F): 97.8 (27 Dec 2022 04:58), Max: 98.2 (26 Dec 2022 18:24)  HR: 84 (27 Dec 2022 04:58) (76 - 84)  BP: 114/81 (27 Dec 2022 04:58) (113/80 - 131/89)  BP(mean): --  RR: 18 (27 Dec 2022 04:58) (18 - 18)  SpO2: 100% (27 Dec 2022 04:58) (98% - 100%)    Parameters below as of 26 Dec 2022 22:05  Patient On (Oxygen Delivery Method): room air        LABS:                        11.7   6.53  )-----------( 407      ( 27 Dec 2022 05:04 )             35.9     12-    138  |  103  |  11  ----------------------------<  94  3.8   |  25  |  0.70    Ca    8.6      27 Dec 2022 05:04  Phos  3.4     12  Mg     1.90         TPro  7.0  /  Alb  3.1<L>  /  TBili  0.6  /  DBili  x   /  AST  20  /  ALT  31  /  AlkPhos  128<H>  12-27      Urinalysis Basic - ( 26 Dec 2022 16:48 )    Color: Light Yellow / Appearance: Clear / S.014 / pH: x  Gluc: x / Ketone: Negative  / Bili: Negative / Urobili: <2 mg/dL   Blood: x / Protein: Negative / Nitrite: Negative   Leuk Esterase: Negative / RBC: x / WBC x   Sq Epi: x / Non Sq Epi: x / Bacteria: x      CAPILLARY BLOOD GLUCOSE          Lower Extremity Physical Exam:  Vascular: DP/PT 2/4, B/L, CFT <3 seconds B/L, Temperature gradient WNL, B/L.   Neuro: Epicritic sensation intact right foot, diminished 0/4 ipswich light touch left foot  Musculoskeletal/Ortho: mild plantarflexed angle, reducible, 0/5 dorsiflexion left side and 4/5 eversion/inversion/plantarflexion - right is WNL  Skin: eschar left lateral fifth metatarsal base, no open lesion grossly, no signs of infection no erythema nor edema bilateral    RADIOLOGY & ADDITIONAL TESTS:

## 2023-08-15 ENCOUNTER — APPOINTMENT (OUTPATIENT)
Dept: CT IMAGING | Age: 33
End: 2023-08-15
Payer: COMMERCIAL

## 2023-08-15 ENCOUNTER — APPOINTMENT (OUTPATIENT)
Dept: VASCULAR LAB | Age: 33
End: 2023-08-15
Payer: COMMERCIAL

## 2023-08-15 ENCOUNTER — OFFICE VISIT (OUTPATIENT)
Dept: PAIN MANAGEMENT | Age: 33
End: 2023-08-15
Payer: COMMERCIAL

## 2023-08-15 ENCOUNTER — HOSPITAL ENCOUNTER (EMERGENCY)
Age: 33
Discharge: HOME | End: 2023-08-15
Attending: EMERGENCY MEDICINE
Payer: COMMERCIAL

## 2023-08-15 VITALS
RESPIRATION RATE: 20 BRPM | TEMPERATURE: 98.2 F | SYSTOLIC BLOOD PRESSURE: 130 MMHG | OXYGEN SATURATION: 96 % | DIASTOLIC BLOOD PRESSURE: 89 MMHG | BODY MASS INDEX: 23.6 KG/M2 | HEART RATE: 91 BPM | WEIGHT: 125 LBS | HEIGHT: 61 IN

## 2023-08-15 VITALS
DIASTOLIC BLOOD PRESSURE: 80 MMHG | OXYGEN SATURATION: 95 % | BODY MASS INDEX: 24.06 KG/M2 | SYSTOLIC BLOOD PRESSURE: 128 MMHG | HEIGHT: 61 IN | WEIGHT: 127.42 LBS | HEART RATE: 85 BPM

## 2023-08-15 DIAGNOSIS — M79.601 RIGHT ARM PAIN: Primary | ICD-10-CM

## 2023-08-15 DIAGNOSIS — M54.10 RADICULOPATHY OF ARM: ICD-10-CM

## 2023-08-15 DIAGNOSIS — M54.12 CERVICAL RADICULOPATHY: ICD-10-CM

## 2023-08-15 DIAGNOSIS — F41.9 SEVERE ANXIETY: ICD-10-CM

## 2023-08-15 DIAGNOSIS — R20.0 RIGHT ARM NUMBNESS: Primary | ICD-10-CM

## 2023-08-15 LAB
ANION GAP SERPL CALCULATED.3IONS-SCNC: 12 MMOL/L (ref 9–17)
BASOPHILS # BLD: 0.04 K/UL (ref 0–0.2)
BASOPHILS NFR BLD: 1 % (ref 0–2)
BUN SERPL-MCNC: 8 MG/DL (ref 6–20)
BUN/CREAT SERPL: 16 (ref 9–20)
CALCIUM SERPL-MCNC: 9.4 MG/DL (ref 8.6–10.4)
CHLORIDE SERPL-SCNC: 104 MMOL/L (ref 98–107)
CO2 SERPL-SCNC: 23 MMOL/L (ref 20–31)
CREAT SERPL-MCNC: 0.5 MG/DL (ref 0.5–0.9)
EOSINOPHIL # BLD: 0.06 K/UL (ref 0–0.44)
EOSINOPHILS RELATIVE PERCENT: 2 % (ref 1–4)
ERYTHROCYTE [DISTWIDTH] IN BLOOD BY AUTOMATED COUNT: 12.6 % (ref 11.8–14.4)
GFR SERPL CREATININE-BSD FRML MDRD: >60 ML/MIN/1.73M2
GLUCOSE SERPL-MCNC: 82 MG/DL (ref 70–99)
HCG SERPL QL: NEGATIVE
HCT VFR BLD AUTO: 43.7 % (ref 36.3–47.1)
HGB BLD-MCNC: 14.5 G/DL (ref 11.9–15.1)
IMM GRANULOCYTES # BLD AUTO: 0.01 K/UL (ref 0–0.3)
IMM GRANULOCYTES NFR BLD: 0 %
LYMPHOCYTES NFR BLD: 0.99 K/UL (ref 1.1–3.7)
LYMPHOCYTES RELATIVE PERCENT: 30 % (ref 24–43)
MCH RBC QN AUTO: 32 PG (ref 25.2–33.5)
MCHC RBC AUTO-ENTMCNC: 33.2 G/DL (ref 28.4–34.8)
MCV RBC AUTO: 96.5 FL (ref 82.6–102.9)
MONOCYTES NFR BLD: 0.24 K/UL (ref 0.1–1.2)
MONOCYTES NFR BLD: 7 % (ref 3–12)
NEUTROPHILS NFR BLD: 60 % (ref 36–65)
NEUTS SEG NFR BLD: 2.01 K/UL (ref 1.5–8.1)
NRBC BLD-RTO: 0 PER 100 WBC
PLATELET # BLD AUTO: 174 K/UL (ref 138–453)
PMV BLD AUTO: 10 FL (ref 8.1–13.5)
POTASSIUM SERPL-SCNC: 3.9 MMOL/L (ref 3.7–5.3)
RBC # BLD AUTO: 4.53 M/UL (ref 3.95–5.11)
SODIUM SERPL-SCNC: 139 MMOL/L (ref 135–144)
WBC OTHER # BLD: 3.4 K/UL (ref 3.5–11.3)

## 2023-08-15 PROCEDURE — 2580000003 HC RX 258: Performed by: PHYSICIAN ASSISTANT

## 2023-08-15 PROCEDURE — 1036F TOBACCO NON-USER: CPT | Performed by: ANESTHESIOLOGY

## 2023-08-15 PROCEDURE — 85025 COMPLETE CBC W/AUTO DIFF WBC: CPT

## 2023-08-15 PROCEDURE — 6360000002 HC RX W HCPCS: Performed by: PHYSICIAN ASSISTANT

## 2023-08-15 PROCEDURE — 71275 CT ANGIOGRAPHY CHEST: CPT

## 2023-08-15 PROCEDURE — G8427 DOCREV CUR MEDS BY ELIG CLIN: HCPCS | Performed by: ANESTHESIOLOGY

## 2023-08-15 PROCEDURE — 99215 OFFICE O/P EST HI 40 MIN: CPT | Performed by: ANESTHESIOLOGY

## 2023-08-15 PROCEDURE — 99285 EMERGENCY DEPT VISIT HI MDM: CPT

## 2023-08-15 PROCEDURE — 93971 EXTREMITY STUDY: CPT | Performed by: STUDENT IN AN ORGANIZED HEALTH CARE EDUCATION/TRAINING PROGRAM

## 2023-08-15 PROCEDURE — 96374 THER/PROPH/DIAG INJ IV PUSH: CPT

## 2023-08-15 PROCEDURE — 6360000004 HC RX CONTRAST MEDICATION: Performed by: PHYSICIAN ASSISTANT

## 2023-08-15 PROCEDURE — 72125 CT NECK SPINE W/O DYE: CPT

## 2023-08-15 PROCEDURE — G8420 CALC BMI NORM PARAMETERS: HCPCS | Performed by: ANESTHESIOLOGY

## 2023-08-15 PROCEDURE — 96375 TX/PRO/DX INJ NEW DRUG ADDON: CPT

## 2023-08-15 PROCEDURE — 73206 CT ANGIO UPR EXTRM W/O&W/DYE: CPT

## 2023-08-15 PROCEDURE — 96376 TX/PRO/DX INJ SAME DRUG ADON: CPT

## 2023-08-15 PROCEDURE — 93971 EXTREMITY STUDY: CPT

## 2023-08-15 PROCEDURE — 84703 CHORIONIC GONADOTROPIN ASSAY: CPT

## 2023-08-15 PROCEDURE — 80048 BASIC METABOLIC PNL TOTAL CA: CPT

## 2023-08-15 RX ORDER — DEXAMETHASONE SODIUM PHOSPHATE 10 MG/ML
10 INJECTION, SOLUTION INTRAMUSCULAR; INTRAVENOUS ONCE
Status: COMPLETED | OUTPATIENT
Start: 2023-08-15 | End: 2023-08-15

## 2023-08-15 RX ORDER — 0.9 % SODIUM CHLORIDE 0.9 %
80 INTRAVENOUS SOLUTION INTRAVENOUS ONCE
Status: COMPLETED | OUTPATIENT
Start: 2023-08-15 | End: 2023-08-15

## 2023-08-15 RX ORDER — PREDNISONE 20 MG/1
20 TABLET ORAL 2 TIMES DAILY
Qty: 10 TABLET | Refills: 0 | Status: SHIPPED | OUTPATIENT
Start: 2023-08-16 | End: 2023-08-21

## 2023-08-15 RX ORDER — SODIUM CHLORIDE 0.9 % (FLUSH) 0.9 %
10 SYRINGE (ML) INJECTION PRN
Status: DISCONTINUED | OUTPATIENT
Start: 2023-08-15 | End: 2023-08-15 | Stop reason: HOSPADM

## 2023-08-15 RX ORDER — HYDROCODONE BITARTRATE AND ACETAMINOPHEN 5; 325 MG/1; MG/1
1-2 TABLET ORAL EVERY 8 HOURS PRN
Qty: 15 TABLET | Refills: 0 | Status: SHIPPED | OUTPATIENT
Start: 2023-08-15 | End: 2023-08-20

## 2023-08-15 RX ORDER — HYDROMORPHONE HYDROCHLORIDE 1 MG/ML
1 INJECTION, SOLUTION INTRAMUSCULAR; INTRAVENOUS; SUBCUTANEOUS ONCE
Status: COMPLETED | OUTPATIENT
Start: 2023-08-15 | End: 2023-08-15

## 2023-08-15 RX ORDER — KETOROLAC TROMETHAMINE 30 MG/ML
30 INJECTION, SOLUTION INTRAMUSCULAR; INTRAVENOUS ONCE
Status: COMPLETED | OUTPATIENT
Start: 2023-08-15 | End: 2023-08-15

## 2023-08-15 RX ORDER — MORPHINE SULFATE 4 MG/ML
4 INJECTION, SOLUTION INTRAMUSCULAR; INTRAVENOUS ONCE
Status: COMPLETED | OUTPATIENT
Start: 2023-08-15 | End: 2023-08-15

## 2023-08-15 RX ORDER — MELOXICAM 15 MG/1
15 TABLET ORAL DAILY
Qty: 15 TABLET | Refills: 0 | Status: SHIPPED | OUTPATIENT
Start: 2023-08-15 | End: 2023-08-30

## 2023-08-15 RX ORDER — CYCLOBENZAPRINE HCL 5 MG
5 TABLET ORAL NIGHTLY PRN
Qty: 15 TABLET | Refills: 0 | Status: SHIPPED | OUTPATIENT
Start: 2023-08-15 | End: 2023-08-30

## 2023-08-15 RX ORDER — ONDANSETRON 2 MG/ML
4 INJECTION INTRAMUSCULAR; INTRAVENOUS ONCE
Status: COMPLETED | OUTPATIENT
Start: 2023-08-15 | End: 2023-08-15

## 2023-08-15 RX ORDER — LIDOCAINE 50 MG/G
PATCH TOPICAL
COMMUNITY
Start: 2023-08-08

## 2023-08-15 RX ADMIN — MORPHINE SULFATE 4 MG: 4 INJECTION, SOLUTION INTRAMUSCULAR; INTRAVENOUS at 12:26

## 2023-08-15 RX ADMIN — HYDROMORPHONE HYDROCHLORIDE 1 MG: 1 INJECTION, SOLUTION INTRAMUSCULAR; INTRAVENOUS; SUBCUTANEOUS at 13:27

## 2023-08-15 RX ADMIN — ONDANSETRON 4 MG: 2 INJECTION INTRAMUSCULAR; INTRAVENOUS at 09:51

## 2023-08-15 RX ADMIN — SODIUM CHLORIDE, PRESERVATIVE FREE 10 ML: 5 INJECTION INTRAVENOUS at 12:00

## 2023-08-15 RX ADMIN — DEXAMETHASONE SODIUM PHOSPHATE 10 MG: 10 INJECTION, SOLUTION INTRAMUSCULAR; INTRAVENOUS at 09:51

## 2023-08-15 RX ADMIN — KETOROLAC TROMETHAMINE 30 MG: 30 INJECTION, SOLUTION INTRAMUSCULAR; INTRAVENOUS at 13:27

## 2023-08-15 RX ADMIN — SODIUM CHLORIDE, PRESERVATIVE FREE 10 ML: 5 INJECTION INTRAVENOUS at 11:39

## 2023-08-15 RX ADMIN — MORPHINE SULFATE 4 MG: 4 INJECTION, SOLUTION INTRAMUSCULAR; INTRAVENOUS at 09:51

## 2023-08-15 RX ADMIN — IOPAMIDOL 75 ML: 755 INJECTION, SOLUTION INTRAVENOUS at 11:38

## 2023-08-15 RX ADMIN — IOPAMIDOL 75 ML: 755 INJECTION, SOLUTION INTRAVENOUS at 11:59

## 2023-08-15 RX ADMIN — SODIUM CHLORIDE 80 ML: 9 INJECTION, SOLUTION INTRAVENOUS at 11:39

## 2023-08-15 RX ADMIN — SODIUM CHLORIDE 80 ML: 9 INJECTION, SOLUTION INTRAVENOUS at 12:00

## 2023-08-15 ASSESSMENT — ENCOUNTER SYMPTOMS
BACK PAIN: 1
VOMITING: 0
WHEEZING: 0
DIARRHEA: 0
SHORTNESS OF BREATH: 0
CHEST TIGHTNESS: 0
CONSTIPATION: 0
NAUSEA: 0

## 2023-08-15 ASSESSMENT — PAIN SCALES - GENERAL
PAINLEVEL_OUTOF10: 10
PAINLEVEL_OUTOF10: 7
PAINLEVEL_OUTOF10: 10
PAINLEVEL_OUTOF10: 10
PAINLEVEL_OUTOF10: 7

## 2023-08-15 ASSESSMENT — PAIN - FUNCTIONAL ASSESSMENT: PAIN_FUNCTIONAL_ASSESSMENT: 0-10

## 2023-08-15 NOTE — PROGRESS NOTES
The patient is a 28 y. o. Non- / non  female. Chief Complaint   Patient presents with    Arm Pain     right        Arm Pain   Associated symptoms include numbness.      80-year-old female who was last seen about 2 years ago  Presented today    Right arm pain  Extend from shoulder all the way down to the fingers  Associated right arm numbness  Describes the pain as sharp shooting throbbing sensation  Onset 1 month ago without any particular injury  Went to ER  diagnostic work-up with x-rays and vascular testing to rule out any fracture or peripheral arterial disease    Pain is constant aggravated with any movement  Physical alleviate the pain  Tried NSAIDs and did not find it helpful    She is extremely tearful and unable to tolerate any provocative testing  Reports no loss of bladder or bowel control  Gait is stable        Patient is here today for: new dx arm pain  Pain level: 10  Character: shooting, stabbing, burning, throbbing  Radiating: from shoulder down to fingertips  Weakness or numbness: numbness  Aggravating Factors: movement and position  Alleviating Factors: nothing  Constant or intermitting: constant  Bladder/bowel loss: no        Past Medical History:   Diagnosis Date    Chronic back pain     Colon polyps     follows with Dr. Rose Owusu for colonoscopy every 5 years    IBS (irritable bowel syndrome)     Neutropenia ([facility])     Dr. Iraida Dhaliwal following    Osteoarthritis         Past Surgical History:   Procedure Laterality Date    ANESTHESIA NERVE BLOCK Right 7/29/2019    TRANSFORAMINAL LUMBAR EPIDURAL STEROID INJECTION AT L4 L5 RIGHT performed by Darius Camejo MD at [address] [place] [address]  01/23/2019    lumbar epidural right, decadron 10    NERVE BLOCK Right 02/06/2019    right lumbar epidural- decadron 10 mg    NERVE BLOCK  02/17/2020    Procedures:  Epidural Steroid Injection Bilateral L5s1    OTHER SURGICAL HISTORY Right 07/29/2019    TRANSFORAMINAL LUMBAR EPIDURAL STEROID

## 2023-08-15 NOTE — ED PROVIDER NOTES
EMERGENCY DEPARTMENT ENCOUNTER   ATTENDING ATTESTATION     Pt Name: Rj Collins  MRN: [id number]  [address] [facility] [place] 1990  Date of evaluation: 8/15/23   Rj Collins is a 28 y.o. female with CC: Arm Pain (right)    MDM:   Patient is a 68-year-old female  who presented to the emergency department secondary to right arm pain and swelling. CTA negative for acute findings as well as CT chest ultrasound negative for DVT. Patient is given outpatient Ortho and neuro follow-up and parameters to return to the emergency department. This visit was performed by both a physician and an APC. I personally evaluated and examined the patient. I performed all aspects of the MDM as documented. CRITICAL CARE:       EKG: All EKG's are interpreted by the Emergency Department Physician who either signs or Co-signs this chart in the absence of a cardiologist.      RADIOLOGY:All plain film, CT, MRI, and formal ultrasound images (except ED bedside ultrasound) are read by the radiologist, see reports below, unless otherwise noted in MDM or here. CTA UPPER EXTREMITY RIGHT W CONTRAST    (Results Pending)   CTA CHEST W CONTRAST    (Results Pending)   Vascular duplex upper extremity venous right    (Results Pending)   CT CERVICAL SPINE WO CONTRAST    (Results Pending)     LABS: All lab results were reviewed by myself, and all abnormals are listed below. Labs Reviewed   CBC WITH AUTO DIFFERENTIAL   BASIC METABOLIC PANEL     CONSULTS:  None  FINAL IMPRESSION      1.  Right arm pain            PASTMEDICAL HISTORY     Past Medical History:   Diagnosis Date    Chronic back pain     Colon polyps     follows with Dr. Girish Whitaker for colonoscopy every 5 years    IBS (irritable bowel syndrome)     Neutropenia ([address])     Dr. Katie Bowens following    Osteoarthritis      SURGICAL HISTORY       Past Surgical History:   Procedure Laterality Date    ANESTHESIA NERVE BLOCK Right 7/29/2019    TRANSFORAMINAL LUMBAR EPIDURAL STEROID INJECTION AT L4 L5

## 2023-08-15 NOTE — ED PROVIDER NOTES
[facility]  eMERGENCY dEPARTMENTeNCOUnter      Pt Name: Eliza Deluca  MRN: [id number]  [address] [facility] [place] 1990  Date ofevaluation: 8/15/2023  Provider: Scott Hall, [address] [facility]       Chief Complaint   Patient presents with    Arm Pain     right         HISTORY OF PRESENT ILLNESS  (Location/Symptom, Timing/Onset, Context/Setting, Quality, Duration, Modifying Factors, Severity.)   Eliza Deluca is a 28 y.o. female who presents to the emergency department with right arm pain has ongoing for over a month. Patient's been to the ER multiple times and seen the PCP. In addition patient saw pain in her doctor today. Patient states the pain was so severe that she could not be examined at the pain management physicians office today. She is unclear what the next step will be with her provider. She decided come here due to significant pain. Also reports that at times her arms turning blue and pale. It gets cold also. Patient shows pictures. Of this concern. Nursing Notes were reviewed. ALLERGIES     Latex    CURRENT MEDICATIONS       Discharge Medication List as of 8/15/2023  1:20 PM        CONTINUE these medications which have NOT CHANGED    Details   gabapentin (NEURONTIN) 400 MG capsule TAKE 1 CAPSULE BY MOUTH DAILY, Disp-30 capsule, R-0Normal      lidocaine (LIDODERM) 5 % Place 1 patch on the skin daily.   Remove & Discard patch within 12 hours or as directed by MDHistorical Med      meloxicam (MOBIC) 15 MG tablet Take 1 tablet by mouth daily for 15 days, Disp-15 tablet, R-0Normal      cyclobenzaprine (FLEXERIL) 5 MG tablet Take 1 tablet by mouth nightly as needed for Muscle spasms, Disp-15 tablet, R-0Normal      DULoxetine (CYMBALTA) 30 MG extended release capsule TAKE 1 CAPSULE BY MOUTH EVERY DAY, Disp-30 capsule, R-0Normal      baclofen (LIORESAL) 10 MG tablet TAKE 1 TABLET BY MOUTH TWO TIMES A DAY, Disp-60 tablet, R-0Normal      ibuprofen (ADVIL;MOTRIN) 800 MG tablet

## 2023-08-16 ENCOUNTER — TELEPHONE (OUTPATIENT)
Dept: ORTHOPEDIC SURGERY | Age: 33
End: 2023-08-16

## 2023-08-16 DIAGNOSIS — M79.601 RIGHT ARM PAIN: Primary | ICD-10-CM

## 2023-08-16 LAB — ECHO BSA: 1.56 M2

## 2023-08-16 NOTE — TELEPHONE ENCOUNTER
Scheduled appt with pt for 08/28/23 --ED follow up was seen @ [facility] on 08/15/23 for right arm pain--states hurt shoulder and elbow back around 07/04/23 and has only gotten worse were hand and fingers swells up and turns pale and go numb---no surg---had xrays done [facility] advise will need to bring copy of xray disc to appt    Not sure if Dr. Dwight Huffman  would want to see sooner due to condition       Please call if needs to be seen sooner,  is currently in a sling @ [phone]

## 2023-08-21 NOTE — TELEPHONE ENCOUNTER
Reviewing the ED encounters, it sounds like this may be vascular in nature and I may not be able to provide much assistance.  I would recommend evaluation by a vascular surgeon

## 2023-08-21 NOTE — TELEPHONE ENCOUNTER
Spoke to patient regarding Dr. Pascual Bee stating to keep the appt for 8/28. Patient verbalized understanding. Patient states she is in a great deal of pain. Patient states she has been in and out of the ED and Urgent Care regarding pain. She is wondering if there is anything Dr. Pascual Bee would recommend for her until her appt.

## 2023-08-23 NOTE — TELEPHONE ENCOUNTER
Informed pt of Dr. Thelma Gastelum response. Pt states that she would still like to be seen in the office by Dr. Rafa Hernandez as she had vascular supply evaluated in ED. Informed pt that there may not be many treatment options that Dr. Rafa Hernandez can provide as he is recommending evaluation by vascular surgeon. Pt states that she is okay with this and would still like to be seen by Dr. Rafa Hernandez as she does not believe this is a vascular issue at this time. Pt additionally states that if it is a vascular issue, she would still like to keep apt with Dr. Rafa Hernandez to receive a referral to vascular surgeon.

## 2023-08-28 ENCOUNTER — OFFICE VISIT (OUTPATIENT)
Dept: ORTHOPEDIC SURGERY | Age: 33
End: 2023-08-28
Payer: COMMERCIAL

## 2023-08-28 VITALS — BODY MASS INDEX: 23.6 KG/M2 | HEIGHT: 61 IN | WEIGHT: 125 LBS | RESPIRATION RATE: 14 BRPM

## 2023-08-28 DIAGNOSIS — M25.521 RIGHT ELBOW PAIN: ICD-10-CM

## 2023-08-28 DIAGNOSIS — M25.511 RIGHT SHOULDER PAIN, UNSPECIFIED CHRONICITY: Primary | ICD-10-CM

## 2023-08-28 PROCEDURE — 1036F TOBACCO NON-USER: CPT | Performed by: ORTHOPAEDIC SURGERY

## 2023-08-28 PROCEDURE — G8420 CALC BMI NORM PARAMETERS: HCPCS | Performed by: ORTHOPAEDIC SURGERY

## 2023-08-28 PROCEDURE — G8427 DOCREV CUR MEDS BY ELIG CLIN: HCPCS | Performed by: ORTHOPAEDIC SURGERY

## 2023-08-28 PROCEDURE — 99203 OFFICE O/P NEW LOW 30 MIN: CPT | Performed by: ORTHOPAEDIC SURGERY

## 2023-08-28 RX ORDER — DICLOFENAC SODIUM 75 MG/1
75 TABLET, DELAYED RELEASE ORAL 2 TIMES DAILY WITH MEALS
Qty: 28 TABLET | Refills: 0 | Status: SHIPPED | OUTPATIENT
Start: 2023-08-28 | End: 2023-09-11

## 2023-09-06 ENCOUNTER — HOSPITAL ENCOUNTER (OUTPATIENT)
Dept: MRI IMAGING | Age: 33
Discharge: HOME | End: 2023-09-08
Attending: ANESTHESIOLOGY
Payer: COMMERCIAL

## 2023-09-06 DIAGNOSIS — M54.12 CERVICAL RADICULOPATHY: ICD-10-CM

## 2023-09-06 DIAGNOSIS — R20.0 RIGHT ARM NUMBNESS: ICD-10-CM

## 2023-09-06 PROCEDURE — 72141 MRI NECK SPINE W/O DYE: CPT

## 2023-09-14 ENCOUNTER — HOSPITAL ENCOUNTER (OUTPATIENT)
Dept: PHYSICAL THERAPY | Age: 33
Setting detail: THERAPIES SERIES
Discharge: HOME | End: 2023-09-14
Payer: COMMERCIAL

## 2023-09-14 PROCEDURE — 97162 PT EVAL MOD COMPLEX 30 MIN: CPT

## 2023-09-14 NOTE — CONSULTS
[x] [facility]) - [facility] & Therapy  [address]  [place]: [phone]   F: [phone]    Physical Therapy Upper Extremity Evaluation    Date:  2023  Patient: Ivett De Leon  : 1990  MRN: [id number]  Physician: Justin Villanueva MD     Insurance: [employer] [facility] then Auth  Medical Diagnosis: (R) shoulder pain M25.511; ADD neck pain M54.2  Rehab Codes: shoulder pain M25.511, neck pain M54.2  Onset Date: 23 referral, symptoms started ~23   Next 's appt: PRN; following EMG  Visit Count:    Cancel/No Show: 0/0    Subjective: Patient presents to therapy with complaints of (R) radicular pain that starts in the (R) scapular region and then travels into the (R) shoulder, arm, into the forearm and then into the (R) radial forearm to the wrist and (R) medial forearm and into the 4th and 5th digits. Patient also noting complaints of neck pain in the C6-C7 region and then into the (B) paraspinals, (L) more than (R). Thinks the neck symptoms are in part due to use of sling around the (L) neck to help immobilize the arm. Notes that she is unable to use the (R) arm for general use, notes difficulty with gripping/grasping due to weakness, notes complaints of dropping even cup of coffee due to (R) hand weakness. Noted shoulder \"popping\" at night a few days after onset of scapular pain and this resulted in significantly more arm symptoms. Did have a cervical MRI done with doctor Melissa Donnelly which was inconclusive for disc issues and the results of this are below. Was inconclusive with cervical testing today with exception of some cervical mobility limitations, mainly with the (L) paraspinals and (L) trapezius, which would indicate more issues in the (L) neck muscle. Patient with no change of symptoms down the arm with repeated clinical testing of the neck today.   She did have an additional EMG test ordered by ordering physician, but this is not scheduled till

## 2023-09-21 ENCOUNTER — HOSPITAL ENCOUNTER (OUTPATIENT)
Dept: PHYSICAL THERAPY | Age: 33
Setting detail: THERAPIES SERIES
Discharge: HOME | End: 2023-09-21
Payer: COMMERCIAL

## 2023-09-21 PROCEDURE — 97110 THERAPEUTIC EXERCISES: CPT

## 2023-09-21 PROCEDURE — 97140 MANUAL THERAPY 1/> REGIONS: CPT

## 2023-09-21 NOTE — FLOWSHEET NOTE
[x] [address] [facility] & Therapy  [address]  [place]: [phone]   F: [phone]    Physical Therapy Daily Treatment Note      Date:  2023  Patient Name:  Ryley Vu    :  1990  MRN: [id number]  Physician: Remedios Madrid MD                                   Insurance: [employer] then Auth  Medical Diagnosis: (R) shoulder pain M25.511; ADD neck pain M54.2                 Rehab Codes: shoulder pain M25.511, neck pain M54.2  Onset Date: 23 referral, symptoms started ~23              Next 's appt: PRN; following EMG  Visit Count:                                 Cancel/No Show: 0/0    Subjective:  Better neck pain but continued complaints of significant pain down the arm from shoulder or elbow down to whole hand. Pain:  [x] Yes  [] No   Location: (R) scapula; (R) posterior lateral shoulder; (R) forearm to (R) radial wrist and (R) 4th and 5th digit; neck pain C6-C7 and then into the (L) paraspinals and (L) trapezius            Pain Rating: (0-10 scale) 7/10  Pain altered Tx:  [] Yes  [] No  Action:    Objective:  Modalities:   Precautions:  Exercises:  Exercise Reps/ Time Weight/ Level Comments   Cervical (L) rotation 5 x 5\"       Cervical (R) SB 5 x 5\"       Supine cervical retraction 10x                 Ulnar nerve glide  10 x 3\"                  scapular retraction     Pain held    Pulleys flexion, abduction      Table slides flexion, V external   Unable to do across body     Other: traction supine, retraction supine 10'    Specific Instructions for next treatment:      Assessment: [] Progressing toward goals. [x] No change. Better with neck symptoms, still significant complaints of numbness down the arm and pain down the arm. Getting pain more in shoulder blade and inferior \"armpit\" with elevation on pulleys and table slides.   Patient to monitor response to added exercises for home to work on general mobility,  Was unable to do

## 2023-09-28 ENCOUNTER — HOSPITAL ENCOUNTER (OUTPATIENT)
Dept: PHYSICAL THERAPY | Age: 33
Setting detail: THERAPIES SERIES
Discharge: HOME | End: 2023-09-28
Payer: COMMERCIAL

## 2023-09-28 NOTE — FLOWSHEET NOTE
[x] [facility]) - [facility] & Therapy  [address]  [place]: [phone]   F: [phone]    Physical Therapy CXL/NS      Date:  2023  Patient Name:  Meenu Rahman    :  1990  MRN: [id number]  Physician: Shayy Beard MD                                   Insurance: [email or web address] [facility]  Medical Diagnosis: (R) shoulder pain M25.511; ADD neck pain M54.2                 Rehab Codes: shoulder pain M25.511, neck pain M54.2  Onset Date: 23 referral, symptoms started ~23              Next 's appt: PRN; following EMG  Visit Count: 2                                Cancel/No Show: 1/0    Patient cancelled today- high pain levels- they were going to do some additional testing and then will return to therapy if needed due to continued high pain levels into the (R) arm. Possible DC- will await further call from patient. Unclear prognosis if discharge as minimal number of visits and continued high pain levels. Presents as possible peripheral nerve issue as she did not get a significant change of symptoms with neck stretching as far as her radicular symptoms.       Electronically signed by:  Fadi Metzger PT

## 2023-11-22 DIAGNOSIS — F41.9 ANXIETY: ICD-10-CM

## 2023-11-22 DIAGNOSIS — G89.29 CHRONIC BILATERAL LOW BACK PAIN WITH RIGHT-SIDED SCIATICA: Chronic | ICD-10-CM

## 2023-11-22 DIAGNOSIS — M54.41 CHRONIC BILATERAL LOW BACK PAIN WITH RIGHT-SIDED SCIATICA: Chronic | ICD-10-CM

## 2023-11-22 RX ORDER — DULOXETIN HYDROCHLORIDE 30 MG/1
CAPSULE, DELAYED RELEASE ORAL DAILY
OUTPATIENT
Start: 2023-11-22

## (undated) DEVICE — BANDAGE ADH W1XL3IN UNIV PLAS NAT GEN USE STRP

## (undated) DEVICE — TOWEL,OR,DSP,ST,BLUE,STD,4/PK,20PK/CS: Brand: MEDLINE

## (undated) DEVICE — 1010 S-DRAPE TOWEL DRAPE 10/BX: Brand: STERI-DRAPE™

## (undated) DEVICE — SINGLE DOSE EPI TY

## (undated) DEVICE — SINGLE SHOT EPIDURAL TRAY: Brand: MEDLINE INDUSTRIES, INC.

## (undated) DEVICE — GLOVE SURG SZ 75 L12IN FNGR THK87MIL WHT LTX FREE

## (undated) DEVICE — NEEDLE SPINAL 22GA L3.5IN SPINOCAN

## (undated) DEVICE — Z DISCONTINUED APPLICATOR SURG PREP 0.35OZ 2% CHG 70% ISO ALC W/ HI LT